# Patient Record
Sex: FEMALE | Race: BLACK OR AFRICAN AMERICAN | Employment: FULL TIME
[De-identification: names, ages, dates, MRNs, and addresses within clinical notes are randomized per-mention and may not be internally consistent; named-entity substitution may affect disease eponyms.]

---

## 2017-01-03 ENCOUNTER — TELEPHONE (OUTPATIENT)
Dept: OBGYN | Facility: CLINIC | Age: 37
End: 2017-01-03

## 2017-02-08 ENCOUNTER — OFFICE VISIT (OUTPATIENT)
Dept: FAMILY MEDICINE CLINIC | Facility: CLINIC | Age: 37
End: 2017-02-08

## 2017-02-08 VITALS
SYSTOLIC BLOOD PRESSURE: 132 MMHG | TEMPERATURE: 98.5 F | DIASTOLIC BLOOD PRESSURE: 78 MMHG | RESPIRATION RATE: 18 BRPM | WEIGHT: 185 LBS | HEART RATE: 78 BPM | BODY MASS INDEX: 25.9 KG/M2 | OXYGEN SATURATION: 99 % | HEIGHT: 71 IN

## 2017-02-08 DIAGNOSIS — K04.01 ACUTE PULPITIS: ICD-10-CM

## 2017-02-08 DIAGNOSIS — K08.89 PAIN, DENTAL: Primary | ICD-10-CM

## 2017-02-08 DIAGNOSIS — K14.1 GEOGRAPHIC TONGUE: ICD-10-CM

## 2017-02-08 PROCEDURE — 99213 OFFICE O/P EST LOW 20 MIN: CPT | Performed by: FAMILY MEDICINE

## 2017-02-08 RX ORDER — TRIAMCINOLONE ACETONIDE 0.1 %
PASTE (GRAM) DENTAL
Qty: 5 G | Refills: 2 | Status: SHIPPED | OUTPATIENT
Start: 2017-02-08 | End: 2017-11-13 | Stop reason: CLARIF

## 2017-02-08 RX ORDER — IBUPROFEN 800 MG/1
800 TABLET ORAL EVERY 6 HOURS PRN
Qty: 90 TABLET | Refills: 3 | Status: SHIPPED | OUTPATIENT
Start: 2017-02-08 | End: 2020-09-17

## 2017-02-08 RX ORDER — TRAMADOL HYDROCHLORIDE 50 MG/1
TABLET ORAL
Qty: 5 TABLET | Refills: 0 | Status: SHIPPED | OUTPATIENT
Start: 2017-02-08 | End: 2017-02-09 | Stop reason: ALTCHOICE

## 2017-02-08 RX ORDER — CLINDAMYCIN HYDROCHLORIDE 150 MG/1
150 CAPSULE ORAL 3 TIMES DAILY
Qty: 21 CAPSULE | Refills: 0 | Status: SHIPPED | OUTPATIENT
Start: 2017-02-08 | End: 2017-02-15

## 2017-02-08 ASSESSMENT — ENCOUNTER SYMPTOMS
GASTROINTESTINAL NEGATIVE: 1
SINUS PRESSURE: 1
EYES NEGATIVE: 1
ALLERGIC/IMMUNOLOGIC NEGATIVE: 1
RESPIRATORY NEGATIVE: 1

## 2017-02-09 ENCOUNTER — TELEPHONE (OUTPATIENT)
Dept: FAMILY MEDICINE CLINIC | Facility: CLINIC | Age: 37
End: 2017-02-09

## 2017-02-09 DIAGNOSIS — K08.89 PAIN, DENTAL: Primary | ICD-10-CM

## 2017-02-09 RX ORDER — TRAMADOL HYDROCHLORIDE 50 MG/1
50 TABLET ORAL EVERY 6 HOURS PRN
Qty: 5 TABLET | Refills: 0 | Status: SHIPPED | OUTPATIENT
Start: 2017-02-09 | End: 2017-11-13 | Stop reason: CLARIF

## 2017-03-14 ENCOUNTER — HOSPITAL ENCOUNTER (OUTPATIENT)
Age: 37
Setting detail: SPECIMEN
Discharge: HOME | End: 2017-03-14
Payer: COMMERCIAL

## 2017-03-14 LAB
ABSOLUTE EOS #: 0.1 K/UL (ref 0–0.4)
ABSOLUTE LYMPH #: 1.7 K/UL (ref 1–4.8)
ABSOLUTE MONO #: 0.4 K/UL (ref 0.1–1.2)
ALBUMIN SERPL-MCNC: 4.5 G/DL (ref 3.5–5.2)
ALBUMIN/GLOBULIN RATIO: 1.3 (ref 1–2.5)
ALP BLD-CCNC: 69 U/L (ref 35–104)
ALT SERPL-CCNC: 17 U/L (ref 5–33)
ANION GAP SERPL CALCULATED.3IONS-SCNC: 15 MMOL/L (ref 9–17)
AST SERPL-CCNC: 22 U/L
BASOPHILS # BLD: 1 % (ref 0–2)
BASOPHILS ABSOLUTE: 0 K/UL (ref 0–0.2)
BILIRUB SERPL-MCNC: 0.28 MG/DL (ref 0.3–1.2)
BUN BLDV-MCNC: 9 MG/DL (ref 6–20)
BUN/CREAT BLD: ABNORMAL (ref 9–20)
CALCIUM SERPL-MCNC: 9.2 MG/DL (ref 8.6–10.4)
CHLORIDE BLD-SCNC: 103 MMOL/L (ref 98–107)
CO2: 21 MMOL/L (ref 20–31)
CREAT SERPL-MCNC: 0.82 MG/DL (ref 0.5–0.9)
DIFFERENTIAL TYPE: ABNORMAL
EOSINOPHILS RELATIVE PERCENT: 1 % (ref 1–4)
GFR AFRICAN AMERICAN: >60 ML/MIN
GFR NON-AFRICAN AMERICAN: >60 ML/MIN
GFR SERPL CREATININE-BSD FRML MDRD: ABNORMAL ML/MIN/{1.73_M2}
GFR SERPL CREATININE-BSD FRML MDRD: ABNORMAL ML/MIN/{1.73_M2}
GLUCOSE BLD-MCNC: 101 MG/DL (ref 70–99)
HCT VFR BLD CALC: 33.7 % (ref 36–46)
HEMOGLOBIN: 10.8 G/DL (ref 12–16)
LYMPHOCYTES # BLD: 36 % (ref 24–44)
MCH RBC QN AUTO: 24.7 PG (ref 26–34)
MCHC RBC AUTO-ENTMCNC: 32 G/DL (ref 31–37)
MCV RBC AUTO: 77 FL (ref 80–100)
MONOCYTES # BLD: 8 % (ref 2–11)
PDW BLD-RTO: 15.3 % (ref 12.5–15.4)
PLATELET # BLD: 249 K/UL (ref 140–450)
PLATELET ESTIMATE: ABNORMAL
PMV BLD AUTO: 10.2 FL (ref 6–12)
POTASSIUM SERPL-SCNC: 4 MMOL/L (ref 3.7–5.3)
RBC # BLD: 4.38 M/UL (ref 4–5.2)
RBC # BLD: ABNORMAL 10*6/UL
SEG NEUTROPHILS: 54 % (ref 36–66)
SEGMENTED NEUTROPHILS ABSOLUTE COUNT: 2.6 K/UL (ref 1.8–7.7)
SODIUM BLD-SCNC: 139 MMOL/L (ref 135–144)
TOTAL PROTEIN: 7.9 G/DL (ref 6.4–8.3)
VITAMIN D 25-HYDROXY: 21.9 NG/ML (ref 30–100)
WBC # BLD: 4.8 K/UL (ref 3.5–11)
WBC # BLD: ABNORMAL 10*3/UL

## 2017-03-20 RX ORDER — ETONOGESTREL/ETHINYL ESTRADIOL .12-.015MG
RING, VAGINAL VAGINAL
Qty: 1 EACH | Refills: 5 | Status: SHIPPED | OUTPATIENT
Start: 2017-03-20 | End: 2017-11-13 | Stop reason: SDUPTHER

## 2017-08-10 ENCOUNTER — TELEPHONE (OUTPATIENT)
Dept: OBGYN CLINIC | Age: 37
End: 2017-08-10

## 2017-08-10 RX ORDER — ETONOGESTREL AND ETHINYL ESTRADIOL 11.7; 2.7 MG/1; MG/1
1 INSERT, EXTENDED RELEASE VAGINAL
Qty: 3 EACH | Refills: 3 | Status: SHIPPED | OUTPATIENT
Start: 2017-08-10 | End: 2018-12-24 | Stop reason: SDUPTHER

## 2017-11-13 ENCOUNTER — HOSPITAL ENCOUNTER (OUTPATIENT)
Age: 37
Setting detail: SPECIMEN
Discharge: HOME | End: 2017-11-13
Payer: COMMERCIAL

## 2017-11-13 ENCOUNTER — OFFICE VISIT (OUTPATIENT)
Dept: OBGYN CLINIC | Age: 37
End: 2017-11-13
Payer: COMMERCIAL

## 2017-11-13 VITALS
HEIGHT: 71 IN | DIASTOLIC BLOOD PRESSURE: 70 MMHG | WEIGHT: 210 LBS | BODY MASS INDEX: 29.4 KG/M2 | SYSTOLIC BLOOD PRESSURE: 124 MMHG

## 2017-11-13 DIAGNOSIS — Z01.419 ENCOUNTER FOR ROUTINE GYNECOLOGICAL EXAMINATION WITH PAPANICOLAOU SMEAR OF CERVIX: Primary | ICD-10-CM

## 2017-11-13 PROCEDURE — 99395 PREV VISIT EST AGE 18-39: CPT | Performed by: OBSTETRICS & GYNECOLOGY

## 2017-11-13 RX ORDER — ETONOGESTREL AND ETHINYL ESTRADIOL 11.7; 2.7 MG/1; MG/1
INSERT, EXTENDED RELEASE VAGINAL
Qty: 1 EACH | Refills: 12 | Status: SHIPPED | OUTPATIENT
Start: 2017-11-13 | End: 2020-07-22 | Stop reason: SDUPTHER

## 2017-11-13 ASSESSMENT — ENCOUNTER SYMPTOMS
DIARRHEA: 0
BLURRED VISION: 0
CONSTIPATION: 0
HEARTBURN: 0
ABDOMINAL PAIN: 0
VOMITING: 0
COUGH: 0
NAUSEA: 0
WHEEZING: 0
ORTHOPNEA: 0
DOUBLE VISION: 0

## 2017-11-13 NOTE — PROGRESS NOTES
well-nourished. HENT:   Head: Normocephalic. Eyes: EOM are normal.   Neck: Normal range of motion. No JVD present. No thyromegaly present. Cardiovascular: Normal rate, regular rhythm and normal heart sounds. Exam reveals no gallop and no friction rub. No murmur heard. Pulmonary/Chest: Effort normal. No respiratory distress. She has no wheezes. She has no rales. She exhibits no mass, no tenderness, no bony tenderness, no laceration, no edema, no swelling and no retraction. Right breast exhibits no inverted nipple, no mass, no nipple discharge, no skin change and no tenderness. Left breast exhibits no inverted nipple, no mass, no nipple discharge, no skin change and no tenderness. Breasts are symmetrical.   Abdominal: Soft. Bowel sounds are normal. She exhibits no distension and no mass. There is no hepatosplenomegaly. There is no tenderness. There is no rebound, no guarding and no CVA tenderness. Genitourinary: No labial fusion. There is no rash, tenderness, lesion or injury on the right labia. There is no rash, tenderness, lesion or injury on the left labia. Uterus is not deviated, not enlarged, not fixed and not tender. Cervix exhibits no motion tenderness, no discharge and no friability. Right adnexum displays no mass, no tenderness and no fullness. Left adnexum displays no mass, no tenderness and no fullness. No erythema, tenderness or bleeding in the vagina. No foreign body in the vagina. No signs of injury around the vagina. No vaginal discharge found. Genitourinary Comments: Fullness appreciated posteriorly on patient's left, feels consistent with fibroid   Musculoskeletal: Normal range of motion. She exhibits no edema, tenderness or deformity. Lymphadenopathy:     She has no cervical adenopathy. Neurological: She is alert and oriented to person, place, and time. She has normal reflexes. No cranial nerve deficit. Skin: Skin is warm and dry. No rash noted. No erythema.    Psychiatric: She

## 2017-11-14 LAB
HPV SAMPLE: NORMAL
HPV SOURCE: NORMAL
HPV, GENOTYPE 16: NOT DETECTED
HPV, GENOTYPE 18: NOT DETECTED
HPV, HIGH RISK OTHER: NOT DETECTED
HPV, INTERPRETATION: NORMAL

## 2017-11-20 LAB — CYTOLOGY REPORT: NORMAL

## 2017-12-01 ENCOUNTER — TELEPHONE (OUTPATIENT)
Dept: OBGYN CLINIC | Age: 37
End: 2017-12-01

## 2017-12-01 NOTE — TELEPHONE ENCOUNTER
patient calling for pap results. Advised ASCUS however HPV was negative return one yr for pap .  Patient verbaliozed understanding

## 2018-02-19 ENCOUNTER — OFFICE VISIT (OUTPATIENT)
Dept: FAMILY MEDICINE CLINIC | Age: 38
End: 2018-02-19
Payer: COMMERCIAL

## 2018-02-19 VITALS
SYSTOLIC BLOOD PRESSURE: 129 MMHG | TEMPERATURE: 98.4 F | HEART RATE: 112 BPM | WEIGHT: 218.6 LBS | BODY MASS INDEX: 30.6 KG/M2 | HEIGHT: 71 IN | DIASTOLIC BLOOD PRESSURE: 87 MMHG

## 2018-02-19 DIAGNOSIS — D64.9 ANEMIA, UNSPECIFIED TYPE: ICD-10-CM

## 2018-02-19 DIAGNOSIS — L40.9 SCALP PSORIASIS: Primary | ICD-10-CM

## 2018-02-19 DIAGNOSIS — Z23 NEED FOR INFLUENZA VACCINATION: ICD-10-CM

## 2018-02-19 PROCEDURE — 90715 TDAP VACCINE 7 YRS/> IM: CPT | Performed by: FAMILY MEDICINE

## 2018-02-19 PROCEDURE — 90472 IMMUNIZATION ADMIN EACH ADD: CPT | Performed by: FAMILY MEDICINE

## 2018-02-19 PROCEDURE — 99213 OFFICE O/P EST LOW 20 MIN: CPT | Performed by: FAMILY MEDICINE

## 2018-02-19 PROCEDURE — 90471 IMMUNIZATION ADMIN: CPT | Performed by: FAMILY MEDICINE

## 2018-02-19 PROCEDURE — 90686 IIV4 VACC NO PRSV 0.5 ML IM: CPT | Performed by: FAMILY MEDICINE

## 2018-02-19 RX ORDER — LANOLIN ALCOHOL/MO/W.PET/CERES
325 CREAM (GRAM) TOPICAL 2 TIMES DAILY
Qty: 90 TABLET | Refills: 0 | Status: SHIPPED | OUTPATIENT
Start: 2018-02-19 | End: 2019-01-31 | Stop reason: SDUPTHER

## 2018-02-19 ASSESSMENT — PATIENT HEALTH QUESTIONNAIRE - PHQ9
SUM OF ALL RESPONSES TO PHQ9 QUESTIONS 1 & 2: 0
2. FEELING DOWN, DEPRESSED OR HOPELESS: 0
1. LITTLE INTEREST OR PLEASURE IN DOING THINGS: 0
SUM OF ALL RESPONSES TO PHQ QUESTIONS 1-9: 0

## 2018-02-19 ASSESSMENT — ENCOUNTER SYMPTOMS
ABDOMINAL PAIN: 0
NAUSEA: 0
VOMITING: 0
COUGH: 0
CONSTIPATION: 0
SHORTNESS OF BREATH: 0
DIARRHEA: 0

## 2018-02-19 NOTE — PROGRESS NOTES
Subjective:      Felton Trotter is a 44 y.o. female with Hx of  has a past medical history of Abnormal Pap smear of cervix; Anxiety; Depression; GERD (gastroesophageal reflux disease); and Headache. Presented to the office today for:     HPI    44years old female with insignificant past medical history who came in to establish care. Patient feels well overall. Reports that she has some scalp rashes that comes on and off over the past few years. Tried ketoconazole shampoo with no relief. The rushes around the hairline. Worse with cold weather and associated with dryness. Had lab work done back in March 2017 shows anemia with hemoglobin of 10.8. Denies lightheadedness headache vision changes chest pain or lower extremity edema. Review of Systems   Constitutional: Negative for chills and fever. Eyes: Negative for visual disturbance. Respiratory: Negative for cough and shortness of breath. Cardiovascular: Negative for chest pain, palpitations and leg swelling. Gastrointestinal: Negative for abdominal pain, constipation, diarrhea, nausea and vomiting. Skin: Positive for rash. Neurological: Negative for headaches. Psychiatric/Behavioral: Negative for behavioral problems. Family History   Problem Relation Age of Onset    No Known Problems Mother     Asthma Father     Diabetes Maternal Grandmother      IDDM    Heart Attack Maternal Grandmother     Stroke Maternal Grandmother     Heart Attack Maternal Grandfather     No Known Problems Paternal Grandmother     No Known Problems Paternal Grandfather        Social History     Social History    Marital status: Single     Spouse name: N/A    Number of children: N/A    Years of education: N/A     Occupational History    Not on file.      Social History Main Topics    Smoking status: Never Smoker    Smokeless tobacco: Never Used    Alcohol use No    Drug use: No    Sexual activity: Yes     Partners: Male     Birth control/ protection: Inserts      Comment: NuvaRing     Other Topics Concern    Not on file     Social History Narrative    No narrative on file       Objective:      /87 (Site: Left Arm, Position: Sitting, Cuff Size: Large Adult) Comment: Machine  Pulse 112   Temp 98.4 °F (36.9 °C) (Temporal)   Ht 5' 10.98\" (1.803 m)   Wt 218 lb 9.6 oz (99.2 kg)   LMP 02/05/2018 (Exact Date)   BMI 30.50 kg/m²    BP Readings from Last 3 Encounters:   02/19/18 129/87   11/13/17 124/70   02/08/17 132/78       Physical Exam    General Appearance:  A&Ox3, NAD  HEENT: Head is NC/AT. No visible rash noted on the hair or skin of the face. Signs of infection  Neck: Supple, no LAD, no thyromegaly  Lungs:  Clear to auscultation B/L. Cardiovascular:  NL S1/S2, RRR, no m,g,r.  Skin: Intact, no bruising or bleeding on exposed skin area  Extremities: No cyanosis, clubbing or edema    Assessment:     1. Scalp psoriasis    2. Anemia, unspecified type    3. Need for influenza vaccination        Plan:      1. Scalp psoriasis  - We'll try hydrocortisone 2% lotion twice a day as needed. - Patient was counseled on possible side effects including skin discoloration.  - Counseled on appropriate use of steroid cream especially on the face  - Consider dermatology referral if no improvement with hydrocortisone    2. Anemia, unspecified type  - Restart ferrous sulfate  - We'll repeat hemoglobin after ferrous sulfate and possibly order iron studies    3. Need for influenza vaccination  - Flu vaccine given today      Paonia received counseling on the following healthy behaviors: nutrition, exercise and medication adherence  Reviewed prior labs and health maintenance  Continue current medications, diet and exercise. Discussed use, benefit, and side effects of prescribed medications. Barriers to medication compliance addressed.    Patient given educational materials - see patient instructions  Was a self-tracking handout given in paper form or via

## 2018-02-20 DIAGNOSIS — L40.9 SCALP PSORIASIS: Primary | ICD-10-CM

## 2018-02-20 RX ORDER — TRIAMCINOLONE ACETONIDE 1 MG/ML
LOTION TOPICAL
Qty: 60 ML | Refills: 0 | Status: SHIPPED | OUTPATIENT
Start: 2018-02-20 | End: 2018-02-27

## 2018-09-06 ENCOUNTER — TELEPHONE (OUTPATIENT)
Dept: OBGYN CLINIC | Age: 38
End: 2018-09-06

## 2018-09-06 RX ORDER — ETONOGESTREL AND ETHINYL ESTRADIOL 11.7; 2.7 MG/1; MG/1
1 INSERT, EXTENDED RELEASE VAGINAL SEE ADMIN INSTRUCTIONS
Qty: 3 EACH | Refills: 11 | Status: SHIPPED | OUTPATIENT
Start: 2018-09-06 | End: 2019-01-31 | Stop reason: SDUPTHER

## 2018-12-10 ENCOUNTER — NURSE ONLY (OUTPATIENT)
Dept: FAMILY MEDICINE CLINIC | Age: 38
End: 2018-12-10
Payer: COMMERCIAL

## 2018-12-10 DIAGNOSIS — Z23 NEED FOR INFLUENZA VACCINATION: Primary | ICD-10-CM

## 2018-12-10 PROCEDURE — 90686 IIV4 VACC NO PRSV 0.5 ML IM: CPT | Performed by: FAMILY MEDICINE

## 2018-12-24 ENCOUNTER — HOSPITAL ENCOUNTER (OUTPATIENT)
Age: 38
Setting detail: SPECIMEN
Discharge: HOME | End: 2018-12-24
Payer: COMMERCIAL

## 2018-12-24 ENCOUNTER — OFFICE VISIT (OUTPATIENT)
Dept: OBGYN CLINIC | Age: 38
End: 2018-12-24
Payer: COMMERCIAL

## 2018-12-24 VITALS — BODY MASS INDEX: 30.49 KG/M2 | SYSTOLIC BLOOD PRESSURE: 126 MMHG | DIASTOLIC BLOOD PRESSURE: 78 MMHG | HEIGHT: 71 IN

## 2018-12-24 DIAGNOSIS — Z01.419 ENCOUNTER FOR GYNECOLOGICAL EXAMINATION: Primary | ICD-10-CM

## 2018-12-24 DIAGNOSIS — Z30.44 ENCOUNTER FOR SURVEILLANCE OF VAGINAL RING HORMONAL CONTRACEPTIVE DEVICE: ICD-10-CM

## 2018-12-24 PROCEDURE — 99395 PREV VISIT EST AGE 18-39: CPT | Performed by: OBSTETRICS & GYNECOLOGY

## 2018-12-24 PROCEDURE — G8482 FLU IMMUNIZE ORDER/ADMIN: HCPCS | Performed by: OBSTETRICS & GYNECOLOGY

## 2018-12-24 RX ORDER — ETONOGESTREL AND ETHINYL ESTRADIOL 11.7; 2.7 MG/1; MG/1
1 INSERT, EXTENDED RELEASE VAGINAL
Qty: 9 EACH | Refills: 1 | Status: SHIPPED | OUTPATIENT
Start: 2018-12-24 | End: 2019-02-26 | Stop reason: SDUPTHER

## 2018-12-24 RX ORDER — DEXTROAMPHETAMINE SACCHARATE, AMPHETAMINE ASPARTATE, DEXTROAMPHETAMINE SULFATE AND AMPHETAMINE SULFATE 3.75; 3.75; 3.75; 3.75 MG/1; MG/1; MG/1; MG/1
TABLET ORAL
COMMUNITY
Start: 2018-12-18 | End: 2021-10-07

## 2018-12-24 RX ORDER — CICLOPIROX 1 G/100ML
SHAMPOO TOPICAL
COMMUNITY
Start: 2018-12-19 | End: 2022-01-11 | Stop reason: SDUPTHER

## 2018-12-24 ASSESSMENT — ENCOUNTER SYMPTOMS
NAUSEA: 0
COUGH: 0
ABDOMINAL PAIN: 0
WHEEZING: 0
CONSTIPATION: 0
VOMITING: 0
DIARRHEA: 0

## 2018-12-24 NOTE — PROGRESS NOTES
[facility]  [facility] [address] [place] [address] Road [address]  Dept: [phone]    DATE OF VISIT:  18        History and Physical    Yanet Soto    :  1980  CHIEF COMPLAINT:    Chief Complaint   Patient presents with    Annual Exam     Prev Pap: 17 WNL                    HPI :   Yanet Soto is a 43 y.o. female here for her annual GYN exam and pap smear. She has no concerns today. Doing well on Nuva Ring and would like to continue but prefers 3 month Rx.      _____________________________________________________________________  Past Medical History:   Diagnosis Date    Abnormal Pap smear of cervix     Anxiety     Depression     GERD (gastroesophageal reflux disease)     Headache                                                                    Past Surgical History:   Procedure Laterality Date     SECTION, LOW TRANSVERSE      COLPOSCOPY      GALLBLADDER SURGERY       Family History   Problem Relation Age of Onset    No Known Problems Mother     Asthma Father     Diabetes Maternal Grandmother         IDDM    Heart Attack Maternal Grandmother     Stroke Maternal Grandmother     Heart Attack Maternal Grandfather     No Known Problems Paternal Grandmother     No Known Problems Paternal Grandfather      History   Smoking Status    Never Smoker   Smokeless Tobacco    Never Used     History   Alcohol Use No     Current Outpatient Prescriptions   Medication Sig Dispense Refill    Ciclopirox 1 % SHAM       amphetamine-dextroamphetamine (ADDERALL) 15 MG tablet       etonogestrel-ethinyl estradiol (NUVARING) 0.12-0.015 MG/24HR vaginal ring Place 1 each vaginally every 21 days Insert one (1) ring vaginally and leave in place for three (3) weeks, then remove for one (1) week.  9 each 1    etonogestrel-ethinyl estradiol (NUVARING) 0.12-0.015 MG/24HR vaginal ring Place 1 each vaginally See Admin Instructions Replace

## 2019-01-10 LAB — CYTOLOGY REPORT: NORMAL

## 2019-01-31 ENCOUNTER — OFFICE VISIT (OUTPATIENT)
Dept: FAMILY MEDICINE CLINIC | Age: 39
End: 2019-01-31
Payer: COMMERCIAL

## 2019-01-31 VITALS
SYSTOLIC BLOOD PRESSURE: 121 MMHG | DIASTOLIC BLOOD PRESSURE: 83 MMHG | WEIGHT: 218.7 LBS | HEART RATE: 98 BPM | TEMPERATURE: 98 F | BODY MASS INDEX: 31.31 KG/M2 | HEIGHT: 70 IN

## 2019-01-31 DIAGNOSIS — M62.838 MUSCLE SPASMS OF NECK: Primary | ICD-10-CM

## 2019-01-31 PROCEDURE — 1036F TOBACCO NON-USER: CPT | Performed by: FAMILY MEDICINE

## 2019-01-31 PROCEDURE — G8417 CALC BMI ABV UP PARAM F/U: HCPCS | Performed by: FAMILY MEDICINE

## 2019-01-31 PROCEDURE — 99211 OFF/OP EST MAY X REQ PHY/QHP: CPT | Performed by: FAMILY MEDICINE

## 2019-01-31 PROCEDURE — G8427 DOCREV CUR MEDS BY ELIG CLIN: HCPCS | Performed by: FAMILY MEDICINE

## 2019-01-31 PROCEDURE — G8482 FLU IMMUNIZE ORDER/ADMIN: HCPCS | Performed by: FAMILY MEDICINE

## 2019-01-31 PROCEDURE — 99213 OFFICE O/P EST LOW 20 MIN: CPT | Performed by: FAMILY MEDICINE

## 2019-01-31 RX ORDER — CYCLOBENZAPRINE HCL 10 MG
10 TABLET ORAL NIGHTLY PRN
Qty: 10 TABLET | Refills: 0 | Status: SHIPPED | OUTPATIENT
Start: 2019-01-31 | End: 2019-02-10

## 2019-01-31 ASSESSMENT — ENCOUNTER SYMPTOMS
BACK PAIN: 0
COUGH: 0
VOMITING: 0
SORE THROAT: 0
ABDOMINAL PAIN: 0
DIARRHEA: 0
SHORTNESS OF BREATH: 0
CONSTIPATION: 0
NAUSEA: 0

## 2019-02-13 ENCOUNTER — TELEPHONE (OUTPATIENT)
Dept: FAMILY MEDICINE CLINIC | Age: 39
End: 2019-02-13

## 2019-02-26 ENCOUNTER — OFFICE VISIT (OUTPATIENT)
Dept: FAMILY MEDICINE CLINIC | Age: 39
End: 2019-02-26
Payer: COMMERCIAL

## 2019-02-26 VITALS — HEART RATE: 80 BPM | SYSTOLIC BLOOD PRESSURE: 123 MMHG | DIASTOLIC BLOOD PRESSURE: 85 MMHG | TEMPERATURE: 98 F

## 2019-02-26 DIAGNOSIS — M62.838 MUSCLE SPASMS OF NECK: Primary | ICD-10-CM

## 2019-02-26 PROCEDURE — 99213 OFFICE O/P EST LOW 20 MIN: CPT | Performed by: STUDENT IN AN ORGANIZED HEALTH CARE EDUCATION/TRAINING PROGRAM

## 2019-02-26 PROCEDURE — 99211 OFF/OP EST MAY X REQ PHY/QHP: CPT | Performed by: STUDENT IN AN ORGANIZED HEALTH CARE EDUCATION/TRAINING PROGRAM

## 2019-02-26 RX ORDER — LISDEXAMFETAMINE DIMESYLATE 70 MG/1
CAPSULE ORAL
COMMUNITY
Start: 2019-01-22 | End: 2019-02-26 | Stop reason: ALTCHOICE

## 2019-02-26 RX ORDER — KETOROLAC TROMETHAMINE 10 MG/1
10 TABLET, FILM COATED ORAL EVERY 6 HOURS PRN
Qty: 20 TABLET | Refills: 0 | Status: SHIPPED | OUTPATIENT
Start: 2019-02-26 | End: 2020-09-17

## 2019-02-26 RX ORDER — IBUPROFEN 800 MG/1
800 TABLET ORAL EVERY 6 HOURS PRN
Qty: 90 TABLET | Refills: 3 | Status: CANCELLED | OUTPATIENT
Start: 2019-02-26 | End: 2019-03-28

## 2019-02-26 ASSESSMENT — PATIENT HEALTH QUESTIONNAIRE - PHQ9
SUM OF ALL RESPONSES TO PHQ9 QUESTIONS 1 & 2: 0
1. LITTLE INTEREST OR PLEASURE IN DOING THINGS: 0
SUM OF ALL RESPONSES TO PHQ QUESTIONS 1-9: 0
SUM OF ALL RESPONSES TO PHQ QUESTIONS 1-9: 0
2. FEELING DOWN, DEPRESSED OR HOPELESS: 0

## 2019-02-26 ASSESSMENT — ENCOUNTER SYMPTOMS
BACK PAIN: 0
CHEST TIGHTNESS: 0
SHORTNESS OF BREATH: 0

## 2019-04-05 ENCOUNTER — HOSPITAL ENCOUNTER (OUTPATIENT)
Age: 39
Setting detail: SPECIMEN
Discharge: HOME | End: 2019-04-05
Payer: COMMERCIAL

## 2019-04-05 LAB
ABSOLUTE EOS #: 0.1 K/UL (ref 0–0.44)
ABSOLUTE IMMATURE GRANULOCYTE: <0.03 K/UL (ref 0–0.3)
ABSOLUTE LYMPH #: 2.15 K/UL (ref 1.1–3.7)
ABSOLUTE MONO #: 0.54 K/UL (ref 0.1–1.2)
ALBUMIN SERPL-MCNC: 4.5 G/DL (ref 3.5–5.2)
ALBUMIN/GLOBULIN RATIO: 1.2 (ref 1–2.5)
ALP BLD-CCNC: 64 U/L (ref 35–104)
ALT SERPL-CCNC: 20 U/L (ref 5–33)
ANION GAP SERPL CALCULATED.3IONS-SCNC: 18 MMOL/L (ref 9–17)
AST SERPL-CCNC: 21 U/L
BASOPHILS # BLD: 0 % (ref 0–2)
BASOPHILS ABSOLUTE: <0.03 K/UL (ref 0–0.2)
BILIRUB SERPL-MCNC: 0.53 MG/DL (ref 0.3–1.2)
BUN BLDV-MCNC: 8 MG/DL (ref 6–20)
BUN/CREAT BLD: ABNORMAL (ref 9–20)
CALCIUM SERPL-MCNC: 9.6 MG/DL (ref 8.6–10.4)
CHLORIDE BLD-SCNC: 110 MMOL/L (ref 98–107)
CHOLESTEROL/HDL RATIO: 2.3
CHOLESTEROL: 132 MG/DL
CO2: 16 MMOL/L (ref 20–31)
CREAT SERPL-MCNC: 0.6 MG/DL (ref 0.5–0.9)
DIFFERENTIAL TYPE: ABNORMAL
EOSINOPHILS RELATIVE PERCENT: 2 % (ref 1–4)
GFR AFRICAN AMERICAN: >60 ML/MIN
GFR NON-AFRICAN AMERICAN: >60 ML/MIN
GFR SERPL CREATININE-BSD FRML MDRD: ABNORMAL ML/MIN/{1.73_M2}
GFR SERPL CREATININE-BSD FRML MDRD: ABNORMAL ML/MIN/{1.73_M2}
GLUCOSE BLD-MCNC: 89 MG/DL (ref 70–99)
HCT VFR BLD CALC: 42.5 % (ref 36.3–47.1)
HDLC SERPL-MCNC: 58 MG/DL
HEMOGLOBIN: 12.6 G/DL (ref 11.9–15.1)
IMMATURE GRANULOCYTES: 0 %
LDL CHOLESTEROL: 60 MG/DL (ref 0–130)
LYMPHOCYTES # BLD: 37 % (ref 24–43)
MCH RBC QN AUTO: 24 PG (ref 25.2–33.5)
MCHC RBC AUTO-ENTMCNC: 29.6 G/DL (ref 28.4–34.8)
MCV RBC AUTO: 80.8 FL (ref 82.6–102.9)
MONOCYTES # BLD: 9 % (ref 3–12)
NRBC AUTOMATED: 0 PER 100 WBC
PDW BLD-RTO: 15.9 % (ref 11.8–14.4)
PLATELET # BLD: 266 K/UL (ref 138–453)
PLATELET ESTIMATE: ABNORMAL
PMV BLD AUTO: 12.5 FL (ref 8.1–13.5)
POTASSIUM SERPL-SCNC: 4.7 MMOL/L (ref 3.7–5.3)
RBC # BLD: 5.26 M/UL (ref 3.95–5.11)
RBC # BLD: ABNORMAL 10*6/UL
SEG NEUTROPHILS: 52 % (ref 36–65)
SEGMENTED NEUTROPHILS ABSOLUTE COUNT: 3.01 K/UL (ref 1.5–8.1)
SODIUM BLD-SCNC: 144 MMOL/L (ref 135–144)
TOTAL PROTEIN: 8.4 G/DL (ref 6.4–8.3)
TRIGL SERPL-MCNC: 71 MG/DL
TSH SERPL DL<=0.05 MIU/L-ACNC: 1.44 MIU/L (ref 0.3–5)
VITAMIN D 25-HYDROXY: 49.3 NG/ML (ref 30–100)
VLDLC SERPL CALC-MCNC: NORMAL MG/DL (ref 1–30)
WBC # BLD: 5.8 K/UL (ref 3.5–11.3)
WBC # BLD: ABNORMAL 10*3/UL

## 2020-07-22 ENCOUNTER — TELEPHONE (OUTPATIENT)
Dept: OBGYN CLINIC | Age: 40
End: 2020-07-22

## 2020-07-22 RX ORDER — ETONOGESTREL AND ETHINYL ESTRADIOL 11.7; 2.7 MG/1; MG/1
INSERT, EXTENDED RELEASE VAGINAL
Qty: 3 EACH | Refills: 4 | Status: SHIPPED | OUTPATIENT
Start: 2020-07-22 | End: 2020-07-23 | Stop reason: CLARIF

## 2020-07-22 NOTE — TELEPHONE ENCOUNTER
36 y/o Pt calling to get her NuvaRing refill and was told by  she needs appt. Pt states she thought she didn't need a pap for 3 yrs. Explained to pt that she needs breast exam, pelvic exam and needed a mammogram after her 39th b-day.     Pt scheduled annual 09/17/20    Pharmacy:  [facility]  Needs 90d supply NuvaRing

## 2020-07-23 RX ORDER — ETONOGESTREL AND ETHINYL ESTRADIOL 11.7; 2.7 MG/1; MG/1
1 INSERT, EXTENDED RELEASE VAGINAL SEE ADMIN INSTRUCTIONS
Qty: 3 EACH | Refills: 1 | Status: SHIPPED | OUTPATIENT
Start: 2020-07-23 | End: 2020-08-27 | Stop reason: SDUPTHER

## 2020-07-23 NOTE — TELEPHONE ENCOUNTER
Informed pt that the office received a call for [facility]. Rico Byers from [employer] states they fill Rx's for workmens comp cases only and this Rx will need to be sent to a different pharmacy. Pt couldn't remember name of her pharmacy and now thinks it is [facility]. Pt asking about central scheduling calling her to schedule her mammogram. Informed pt that they should call her but did realize yesterday during our phone call she was trying to do multiple things at once. I mailed her order to her with central scheduling number on it. (pt had been trying to talk to 2 people at the same time and write down phone number.)    Resent Rx to [facility].

## 2020-08-26 ENCOUNTER — TELEPHONE (OUTPATIENT)
Dept: OBGYN CLINIC | Age: 40
End: 2020-08-26

## 2020-08-26 NOTE — TELEPHONE ENCOUNTER
36 y/o Pt calling to say she is having trouble getting her NuvaRing fill thru [facility] and would like to just go back to filling Rx monthly thru [employer]. Pharmacy:  [facility]    12/24/18 Last Annual exam w/  Scheduled for 09/17/20 annual exam w/

## 2020-08-27 RX ORDER — ETONOGESTREL AND ETHINYL ESTRADIOL 11.7; 2.7 MG/1; MG/1
1 INSERT, EXTENDED RELEASE VAGINAL SEE ADMIN INSTRUCTIONS
Qty: 3 EACH | Refills: 1 | Status: SHIPPED | OUTPATIENT
Start: 2020-08-27 | End: 2021-10-07

## 2020-09-17 ENCOUNTER — OFFICE VISIT (OUTPATIENT)
Dept: OBGYN CLINIC | Age: 40
End: 2020-09-17
Payer: COMMERCIAL

## 2020-09-17 ENCOUNTER — HOSPITAL ENCOUNTER (OUTPATIENT)
Age: 40
Setting detail: SPECIMEN
Discharge: HOME | End: 2020-09-17
Payer: COMMERCIAL

## 2020-09-17 VITALS
BODY MASS INDEX: 29.12 KG/M2 | HEIGHT: 71 IN | WEIGHT: 208 LBS | SYSTOLIC BLOOD PRESSURE: 124 MMHG | DIASTOLIC BLOOD PRESSURE: 80 MMHG

## 2020-09-17 PROCEDURE — 99396 PREV VISIT EST AGE 40-64: CPT | Performed by: OBSTETRICS & GYNECOLOGY

## 2020-09-17 RX ORDER — ETONOGESTREL AND ETHINYL ESTRADIOL 11.7; 2.7 MG/1; MG/1
1 INSERT, EXTENDED RELEASE VAGINAL
Qty: 4 EACH | Refills: 4 | Status: SHIPPED | OUTPATIENT
Start: 2020-09-17 | End: 2021-10-04 | Stop reason: SDUPTHER

## 2020-09-17 ASSESSMENT — ENCOUNTER SYMPTOMS
COUGH: 0
ABDOMINAL PAIN: 0
VOMITING: 0
DIARRHEA: 0
CONSTIPATION: 0
NAUSEA: 0
WHEEZING: 0

## 2020-09-17 NOTE — PROGRESS NOTES
[facility]  [facility] [address] [address]  Dept: [phone]  DATE OF VISIT:  20        History and Physical    Juvenal Whitman    :  1980  CHIEF COMPLAINT:    Chief Complaint   Patient presents with    Annual Exam     Prev Pap: 18 WNL                    HPI :   Juvenal Whitman is a 35 y.o. female presents for her annual exam. She uses NuvaRing to control her periods and for contraception. She will occasionally stop Nuvaring for a few months and notices her periods are extremely heavy and last 7 days, but they are much more manageable with the NuvaRing. She is interested in taking it continuously. _____________________________________________________________________  Past Medical History:   Diagnosis Date    Abnormal Pap smear of cervix     Anxiety     Depression     GERD (gastroesophageal reflux disease)     Headache                                                                    Past Surgical History:   Procedure Laterality Date     SECTION, LOW TRANSVERSE      COLPOSCOPY      GALLBLADDER SURGERY  2004     Family History   Problem Relation Age of Onset    No Known Problems Mother     Asthma Father     Diabetes Maternal Grandmother         IDDM    Heart Attack Maternal Grandmother     Stroke Maternal Grandmother     Heart Attack Maternal Grandfather     No Known Problems Paternal Grandmother     No Known Problems Paternal Grandfather      Social History     Tobacco Use   Smoking Status Never Smoker   Smokeless Tobacco Never Used     Social History     Substance and Sexual Activity   Alcohol Use No    Alcohol/week: 0.0 standard drinks     Current Outpatient Medications   Medication Sig Dispense Refill    etonogestrel-ethinyl estradiol (NUVARING) 0.12-0.015 MG/24HR vaginal ring Place 1 each vaginally every 21 days Insert one (1) ring vaginally and leave in place for three (3) weeks, then replace. Note to pharmacy: pt taking continuously. 4 each 4    etonogestrel-ethinyl estradiol (NUVARING) 0.12-0.015 MG/24HR vaginal ring Place 1 each vaginally See Admin Instructions Insert one (1) ring vaginally and leave in place for three (3) weeks, then remove for one (1) week. 3 each 1    Ciclopirox 1 % SHAM       amphetamine-dextroamphetamine (ADDERALL) 15 MG tablet        No current facility-administered medications for this visit. Allergies:  Keflex [cephalexin]    Gynecologic History:  Patient's last menstrual period was 08/15/2020. Sexually Active:Yes  STD History: No  Pap Smear History: h/o ASCUS      OB History    Para Term  AB Living   1 1 1 0 0 1   SAB TAB Ectopic Molar Multiple Live Births   0 0 0 0 0 1       Review of Systems   Constitutional: Negative for chills and fever. HENT: Negative for hearing loss. Respiratory: Negative for cough and wheezing. Cardiovascular: Negative for chest pain and palpitations. Gastrointestinal: Negative for abdominal pain, constipation, diarrhea, nausea and vomiting. Genitourinary: Negative for dysuria, frequency and urgency. Musculoskeletal: Negative for myalgias. Skin: Negative for rash. Neurological: Negative for dizziness, weakness and headaches. Hematological: Does not bruise/bleed easily. Psychiatric/Behavioral: Negative for suicidal ideas. /80 (Position: Sitting, Cuff Size: Medium Adult)   Ht 5' 11\" (1.803 m)   Wt 208 lb (94.3 kg)   LMP 08/15/2020   BMI 29.01 kg/m²     Physical Exam  Constitutional:       Appearance: She is well-developed. HENT:      Head: Normocephalic. Neck:      Thyroid: No thyromegaly. Vascular: No JVD. Cardiovascular:      Rate and Rhythm: Normal rate and regular rhythm. Pulmonary:      Effort: Pulmonary effort is normal. No respiratory distress. Breath sounds: Normal breath sounds. No wheezing or rales. Chest:      Chest wall: No tenderness.       Breasts: Breasts are symmetrical.         Right: No inverted nipple, mass, nipple discharge, skin change or tenderness. Left: No inverted nipple, mass, nipple discharge, skin change or tenderness. Abdominal:      General: Bowel sounds are normal. There is no distension. Palpations: Abdomen is soft. Tenderness: There is no abdominal tenderness. Genitourinary:     Exam position: Supine. Labia:         Right: No rash, tenderness, lesion or injury. Left: No rash, tenderness, lesion or injury. Vagina: Normal. No foreign body. No erythema or bleeding. Cervix: No cervical motion tenderness, discharge or friability. Uterus: Not deviated, not enlarged, not fixed and not tender. Adnexa:         Right: No mass, tenderness or fullness. Left: No mass, tenderness or fullness. Musculoskeletal: Normal range of motion. Lymphadenopathy:      Cervical: No cervical adenopathy. Skin:     General: Skin is warm and dry. Neurological:      Mental Status: She is alert and oriented to person, place, and time. Deep Tendon Reflexes: Reflexes are normal and symmetric. Psychiatric:         Behavior: Behavior normal.         Thought Content: Thought content normal.         Judgment: Judgment normal.                 ASSESSMENT:    35 y.o. Female; Annual   Diagnosis Orders   1. Encounter for gynecological examination  PAP SMEAR   2. Encounter for screening mammogram for breast cancer  [facility] DIGITAL SCREEN W OR WO CAD BILATERAL     Return in about 1 year (around 9/17/2021) for annual exam.              Hereditary Breast, Ovarian, Colon and Uterine Cancer screening Done. Tobacco & Secondary smoke risks reviewed; instructed on cessation and avoidance  :  - Pap collected, pt prefers yearly.  -Discussed menopausal symptoms, HRT, incontinence. Reviewed option of IUD for AUB.    - Screening mammogram discussed and advised yearly if normalstarting at age 35.  - Calcium and Vitamin D dosing reviewed. - Colonoscopy screening reviewed. - General diet and exercise reviewed. - Routine health maintenance per patients PCP.     Electronically signed by Rodger Aparicio MD on 9/17/20 at 10:43 AM EDT  [facility]

## 2020-09-25 LAB
HPV SOURCE: NORMAL
HPV, GENOTYPE 16: NOT DETECTED
HPV, GENOTYPE 18: NOT DETECTED
HPV, HIGH RISK OTHER: NOT DETECTED

## 2020-09-30 LAB — CYTOLOGY REPORT: NORMAL

## 2020-10-07 ENCOUNTER — NURSE TRIAGE (OUTPATIENT)
Dept: OTHER | Facility: CLINIC | Age: 40
End: 2020-10-07

## 2020-10-07 NOTE — TELEPHONE ENCOUNTER
Attention provider: Your patient utilized nurse triage services offered by employer, payer or community. This encounter includes an overview of the reason for call, assessment and recommended disposition. Please do not respond through this encounter as the response is not directed to a shared pool. Reason for Disposition   Ringworm    Answer Assessment - Initial Assessment Questions  1. APPEARANCE of RASH: \"What does the rash look like? \"       [place] with a bunch of bumps, 2 spots,     2. LOCATION: \"Where is the rash located? \"       Thigh, 2 pots, right thigh    3. SIZE: \"How large are the spots? \"     Dime    4. NUMBER: \"How many spots are there?\"   2    5. ONSET: \"When did the ringworm start? \"    Over the past 2-3 days    6. OTHER SYMPTOMS: \"Do you have any other symptoms? \" (e.g., fever, headache, etc.)    Slightly itching  7. PREGNANCY: \"Is there any chance you are pregnant? \" \"When was your last menstrual period? \"  denies    Protocols used: [id number]

## 2021-01-05 ENCOUNTER — NURSE ONLY (OUTPATIENT)
Dept: FAMILY MEDICINE CLINIC | Age: 41
End: 2021-01-05
Payer: COMMERCIAL

## 2021-01-05 VITALS — TEMPERATURE: 98.2 F

## 2021-01-05 DIAGNOSIS — Z23 NEED FOR INFLUENZA VACCINATION: Primary | ICD-10-CM

## 2021-01-05 PROCEDURE — 90686 IIV4 VACC NO PRSV 0.5 ML IM: CPT | Performed by: STUDENT IN AN ORGANIZED HEALTH CARE EDUCATION/TRAINING PROGRAM

## 2021-01-05 PROCEDURE — 99421 OL DIG E/M SVC 5-10 MIN: CPT | Performed by: STUDENT IN AN ORGANIZED HEALTH CARE EDUCATION/TRAINING PROGRAM

## 2021-01-07 ENCOUNTER — OFFICE VISIT (OUTPATIENT)
Dept: FAMILY MEDICINE CLINIC | Age: 41
End: 2021-01-07
Payer: COMMERCIAL

## 2021-01-07 VITALS
DIASTOLIC BLOOD PRESSURE: 86 MMHG | HEIGHT: 70 IN | HEART RATE: 102 BPM | BODY MASS INDEX: 29.78 KG/M2 | SYSTOLIC BLOOD PRESSURE: 137 MMHG | WEIGHT: 208 LBS | TEMPERATURE: 98.1 F

## 2021-01-07 DIAGNOSIS — M25.531 RIGHT WRIST PAIN: Primary | ICD-10-CM

## 2021-01-07 DIAGNOSIS — R63.5 WEIGHT GAIN: ICD-10-CM

## 2021-01-07 PROCEDURE — G8482 FLU IMMUNIZE ORDER/ADMIN: HCPCS | Performed by: STUDENT IN AN ORGANIZED HEALTH CARE EDUCATION/TRAINING PROGRAM

## 2021-01-07 PROCEDURE — G8427 DOCREV CUR MEDS BY ELIG CLIN: HCPCS | Performed by: STUDENT IN AN ORGANIZED HEALTH CARE EDUCATION/TRAINING PROGRAM

## 2021-01-07 PROCEDURE — 99213 OFFICE O/P EST LOW 20 MIN: CPT | Performed by: STUDENT IN AN ORGANIZED HEALTH CARE EDUCATION/TRAINING PROGRAM

## 2021-01-07 PROCEDURE — 1036F TOBACCO NON-USER: CPT | Performed by: STUDENT IN AN ORGANIZED HEALTH CARE EDUCATION/TRAINING PROGRAM

## 2021-01-07 PROCEDURE — G8419 CALC BMI OUT NRM PARAM NOF/U: HCPCS | Performed by: STUDENT IN AN ORGANIZED HEALTH CARE EDUCATION/TRAINING PROGRAM

## 2021-01-07 RX ORDER — PHENYLEPHRINE HYDROCHLORIDE 10 MG/1
1 TABLET, COATED ORAL NIGHTLY
Qty: 1 EACH | Refills: 0 | Status: SHIPPED | OUTPATIENT
Start: 2021-01-07

## 2021-01-07 RX ORDER — MELOXICAM 15 MG/1
15 TABLET ORAL DAILY
Qty: 20 TABLET | Refills: 0 | Status: SHIPPED | OUTPATIENT
Start: 2021-01-07 | End: 2021-01-25

## 2021-01-07 ASSESSMENT — ENCOUNTER SYMPTOMS
COUGH: 0
NAUSEA: 0
CONSTIPATION: 0
SORE THROAT: 0
DIARRHEA: 0
SHORTNESS OF BREATH: 0
CHEST TIGHTNESS: 0
ABDOMINAL PAIN: 0
VOMITING: 0

## 2021-01-07 ASSESSMENT — PATIENT HEALTH QUESTIONNAIRE - PHQ9
SUM OF ALL RESPONSES TO PHQ QUESTIONS 1-9: 0
SUM OF ALL RESPONSES TO PHQ QUESTIONS 1-9: 0
2. FEELING DOWN, DEPRESSED OR HOPELESS: 0

## 2021-01-07 NOTE — PROGRESS NOTES
Visit Information    Have you changed or started any medications since your last visit including any over-the-counter medicines, vitamins, or herbal medicines? no   Have you stopped taking any of your medications? Is so, why? -  no  Are you having any side effects from any of your medications? - no    Have you seen any other physician or provider since your last visit?  no   Have you had any other diagnostic tests since your last visit?  no   Have you been seen in the emergency room and/or had an admission in a hospital since we last saw you?  no   Have you had your routine dental cleaning in the past 6 months?  no     Do you have an active MyChart account? If no, what is the barrier?   Yes    Patient Care Team:  Tj Kelly MD as PCP - General (Family Medicine)    Medical History Review  Past Medical, Family, and Social History reviewed and does contribute to the patient presenting condition    Health Maintenance   Topic Date Due    Hepatitis C screen  1980    HIV screen  10/01/1994    Lipid screen  04/05/2024    Cervical cancer screen  09/17/2025    DTaP/Tdap/Td vaccine (2 - Td) 02/19/2028    Flu vaccine  Completed    Hepatitis A vaccine  Aged Out    Hepatitis B vaccine  Aged Out    Hib vaccine  Aged Out    Meningococcal (ACWY) vaccine  Aged Out    Pneumococcal 0-64 years Vaccine  Aged Out

## 2021-01-07 NOTE — PROGRESS NOTES
Musculoskeletal: Negative for joint swelling. Right wrist pain   Skin: Negative for rash. Neurological: Negative for dizziness, weakness and headaches. Objective:    /86 (Site: Right Upper Arm, Position: Sitting, Cuff Size: Large Adult)   Pulse 102   Temp 98.1 °F (36.7 °C) (Temporal)   Ht 5' 10\" (1.778 m)   Wt 208 lb (94.3 kg)   BMI 29.84 kg/m²    BP Readings from Last 3 Encounters:   01/07/21 137/86   09/17/20 124/80   02/26/19 123/85     Physical Exam  Vitals signs and nursing note reviewed. Constitutional:       Appearance: She is well-developed. Cardiovascular:      Rate and Rhythm: Normal rate and regular rhythm. Heart sounds: Normal heart sounds. No murmur. No friction rub. No gallop. Pulmonary:      Effort: Pulmonary effort is normal. No respiratory distress. Breath sounds: Normal breath sounds. No wheezing or rales. Chest:      Chest wall: No tenderness. Abdominal:      General: Bowel sounds are normal. There is no distension. Palpations: Abdomen is soft. There is no mass. Tenderness: There is no abdominal tenderness. There is no guarding. Musculoskeletal:      Comments: POSITIVE TINNEL AND PHALEN SIGN   Skin:     Capillary Refill: Capillary refill takes less than 2 seconds. Neurological:      Mental Status: She is alert and oriented to person, place, and time. Lab Results   Component Value Date    WBC 5.8 04/05/2019    HGB 12.6 04/05/2019    HCT 42.5 04/05/2019     04/05/2019    CHOL 132 04/05/2019    TRIG 71 04/05/2019    HDL 58 04/05/2019    ALT 20 04/05/2019    AST 21 04/05/2019     04/05/2019    K 4.7 04/05/2019     (H) 04/05/2019    CREATININE 0.60 04/05/2019    BUN 8 04/05/2019    CO2 16 (L) 04/05/2019    TSH 1.44 04/05/2019     Lab Results   Component Value Date    CALCIUM 9.6 04/05/2019     Lab Results   Component Value Date    LDLCHOLESTEROL 60 04/05/2019       Assessment and Plan:    1.  Right wrist pain  - Elastic Bandages & Supports (CARPAL TUNNEL WRIST STABILIZER) MISC; 1 applicator by Does not apply route nightly RIGHT WRIST  Dispense: 1 each; Refill: 0  - meloxicam (MOBIC) 15 MG tablet; Take 1 tablet by mouth daily  Dispense: 20 tablet; Refill: 0    2. Weight gain  - TSH With Reflex Ft4; Future  - Vitamin D 25 Hydroxy; Future  - Basic Metabolic Panel; Future    Requested Prescriptions     Signed Prescriptions Disp Refills    Elastic Bandages & Supports (CARPAL TUNNEL WRIST STABILIZER) MISC 1 each 0     Si applicator by Does not apply route nightly RIGHT WRIST    meloxicam (MOBIC) 15 MG tablet 20 tablet 0     Sig: Take 1 tablet by mouth daily       There are no discontinued medications. Return in about 4 weeks (around 2021) for CARPAL TUNNEL F/U SP WRIST BRACE. Lopez received counseling on the following healthy behaviors: nutrition and exercise  Reviewed prior labs and health maintenance  Continue current medications, diet and exercise. Discussed use, benefit, and side effects of prescribed medications. Barriers to medication compliance addressed. Patient given educational materials - see patient instructions  Was a self-tracking handout given in paper form or via [facility]? Yes    Requested Prescriptions     Signed Prescriptions Disp Refills    Elastic Bandages & Supports (CARPAL TUNNEL WRIST STABILIZER) MISC 1 each 0     Si applicator by Does not apply route nightly RIGHT WRIST    meloxicam (MOBIC) 15 MG tablet 20 tablet 0     Sig: Take 1 tablet by mouth daily       All patient questions answered. Patient voiced understanding. Quality Measures    Body mass index is 29.84 kg/m². Normal. Weight control planned discussed Healthy diet and regular exercise. BP: 137/86 Blood pressure is normal. Treatment plan consists of No treatment change needed.     Lab Results   Component Value Date    LDLCHOLESTEROL 60 2019    (goal LDL reduction with dx if diabetes is 50% LDL reduction) PHQ Scores 1/7/2021 2/26/2019 2/19/2018 12/28/2016   PHQ2 Score 0 0 0 2   PHQ9 Score 0 0 0 2     Interpretation of Total Score Depression Severity: 1-4 = Minimal depression, 5-9 = Mild depression, 10-14 = Moderate depression, 15-19 = Moderately severe depression, 20-27 = Severe depression

## 2021-01-07 NOTE — PROGRESS NOTES
I have reviewed and discussed key elements of [address] [place] [address] with the resident including plan of care and follow up and agree with the care kourtney plan.

## 2021-01-14 ENCOUNTER — HOSPITAL ENCOUNTER (OUTPATIENT)
Age: 41
Setting detail: SPECIMEN
Discharge: HOME | End: 2021-01-14
Payer: COMMERCIAL

## 2021-01-14 DIAGNOSIS — R63.5 WEIGHT GAIN: ICD-10-CM

## 2021-01-14 LAB
ANION GAP SERPL CALCULATED.3IONS-SCNC: 8 MMOL/L (ref 9–17)
BUN BLDV-MCNC: 8 MG/DL (ref 6–20)
BUN/CREAT BLD: ABNORMAL (ref 9–20)
CALCIUM SERPL-MCNC: 9.2 MG/DL (ref 8.6–10.4)
CHLORIDE BLD-SCNC: 107 MMOL/L (ref 98–107)
CO2: 20 MMOL/L (ref 20–31)
CREAT SERPL-MCNC: 0.64 MG/DL (ref 0.5–0.9)
GFR AFRICAN AMERICAN: >60 ML/MIN
GFR NON-AFRICAN AMERICAN: >60 ML/MIN
GFR SERPL CREATININE-BSD FRML MDRD: ABNORMAL ML/MIN/{1.73_M2}
GFR SERPL CREATININE-BSD FRML MDRD: ABNORMAL ML/MIN/{1.73_M2}
GLUCOSE BLD-MCNC: 90 MG/DL (ref 70–99)
POTASSIUM SERPL-SCNC: 3.9 MMOL/L (ref 3.7–5.3)
SODIUM BLD-SCNC: 135 MMOL/L (ref 135–144)
TSH SERPL DL<=0.05 MIU/L-ACNC: 0.93 MIU/L (ref 0.3–5)
VITAMIN D 25-HYDROXY: 27.7 NG/ML (ref 30–100)

## 2021-01-18 ENCOUNTER — OFFICE VISIT (OUTPATIENT)
Dept: FAMILY MEDICINE CLINIC | Age: 41
End: 2021-01-18
Payer: COMMERCIAL

## 2021-01-18 ENCOUNTER — TELEPHONE (OUTPATIENT)
Dept: FAMILY MEDICINE CLINIC | Age: 41
End: 2021-01-18

## 2021-01-18 VITALS
TEMPERATURE: 97.1 F | BODY MASS INDEX: 31.75 KG/M2 | DIASTOLIC BLOOD PRESSURE: 84 MMHG | SYSTOLIC BLOOD PRESSURE: 131 MMHG | HEART RATE: 95 BPM | WEIGHT: 221.8 LBS | HEIGHT: 70 IN

## 2021-01-18 DIAGNOSIS — G89.29 WRIST PAIN, CHRONIC, RIGHT: ICD-10-CM

## 2021-01-18 DIAGNOSIS — E66.9 CLASS 1 OBESITY WITHOUT SERIOUS COMORBIDITY WITH BODY MASS INDEX (BMI) OF 31.0 TO 31.9 IN ADULT, UNSPECIFIED OBESITY TYPE: Primary | ICD-10-CM

## 2021-01-18 DIAGNOSIS — M25.531 WRIST PAIN, CHRONIC, RIGHT: ICD-10-CM

## 2021-01-18 PROCEDURE — G8482 FLU IMMUNIZE ORDER/ADMIN: HCPCS | Performed by: FAMILY MEDICINE

## 2021-01-18 PROCEDURE — G8417 CALC BMI ABV UP PARAM F/U: HCPCS | Performed by: FAMILY MEDICINE

## 2021-01-18 PROCEDURE — 99213 OFFICE O/P EST LOW 20 MIN: CPT | Performed by: FAMILY MEDICINE

## 2021-01-18 PROCEDURE — 1036F TOBACCO NON-USER: CPT | Performed by: FAMILY MEDICINE

## 2021-01-18 PROCEDURE — G8427 DOCREV CUR MEDS BY ELIG CLIN: HCPCS | Performed by: FAMILY MEDICINE

## 2021-01-18 RX ORDER — BUPROPION HYDROCHLORIDE 300 MG/1
TABLET ORAL
COMMUNITY
Start: 2020-12-18 | End: 2021-10-07

## 2021-01-18 RX ORDER — DIAZEPAM 5 MG/1
TABLET ORAL
COMMUNITY
Start: 2020-12-21 | End: 2021-10-07

## 2021-01-18 RX ORDER — NAPROXEN 500 MG/1
500 TABLET ORAL 2 TIMES DAILY WITH MEALS
Qty: 60 TABLET | Refills: 0 | Status: SHIPPED | OUTPATIENT
Start: 2021-01-18 | End: 2021-02-09

## 2021-01-18 RX ORDER — FLUOCINOLONE ACETONIDE 0.11 MG/ML
OIL TOPICAL
COMMUNITY
Start: 2020-12-21 | End: 2022-01-11 | Stop reason: SDUPTHER

## 2021-01-18 NOTE — PATIENT INSTRUCTIONS
Thank you for letting us take care of you today. We hope all your questions were addressed. If a question was overlooked or something else comes to mind after you return home, please contact a member of your Care Team listed below. Your Care Team at [place] [address] is Team #5  Leslee Rothman MD (Faculty)  Sherie Castellanos MD (Resident)  Efe Bustos MD (Resident)  Jamarcus Reynolds MD (Resident)  FLORIDA Reeves. ,BRAYAN COBURN, BRAYAN Mederos ([facility] office)  [facility] office)  Carmen Merchant, [address] (Clinical Practice Manager)  Judith Carr [facility] (Clinical Pharmacist)       Office phone number: [phone]    If you need to get in right away due to illness, please be advised we have \"Same Day\" appointments available Monday-Friday. Please call us at [phone] option #3 to schedule your \"Same Day\" appointment.

## 2021-01-18 NOTE — PROGRESS NOTES
2021     Kesha Cruz (:  1980) is a 38 y.o. female, here for evaluation of the following medical concerns:  Chief Complaint   Patient presents with    2 Week Follow-Up     carpal tunnel    Weight Gain    Discuss Labs     results     Discuss Medications     pain medication meloxiam not helping     Other     order for wrist DME not covered by insurance      HPI   Dx with carpal tunnel prior visits , prescribed a splint , did not obtain. Meloxicam not helping   Asking about trying diet pills although states she does not have a big appetite. Asking about adipex. On adderall. Review of Systems   Constitutional: Negative for fatigue and fever. Respiratory: Negative for cough and shortness of breath. Musculoskeletal: Positive for arthralgias. Neurological: Negative for numbness. Prior to Visit Medications    Medication Sig Taking? Authorizing Provider   buPROPion (WELLBUTRIN XL) 300 MG extended release tablet take 1 tablet by mouth every morning Yes Historical Provider, MD   fluocinolone (DERMA-SMOOTHE) 0.01 % external oil  Yes Historical Provider, MD   naproxen (NAPROSYN) 500 MG tablet Take 1 tablet by mouth 2 times daily (with meals) Yes El Bryson DO   etonogestrel-ethinyl estradiol (NUVARING) 0.12-0.015 MG/24HR vaginal ring Place 1 each vaginally every 21 days Insert one (1) ring vaginally and leave in place for three (3) weeks, then replace. Note to pharmacy: pt taking continuously. Yes Irena Watters MD   Ciclopirox 1 % SHAM  Yes Historical Provider, MD   amphetamine-dextroamphetamine (ADDERALL) 15 MG tablet  Yes Historical Provider, MD   vitamin D (ERGOCALCIFEROL) 1.25 MG (49796 UT) CAPS capsule Take 1 capsule by mouth once a week  Angella Brunson MD   diazePAM (VALIUM) 5 MG tablet take 1 tablet by mouth 1 HOUR BEFORE BED, THEN NIGHT BEFORE DENTA. ..  (REFER TO PRESCRIPTION NOTES).   Historical Provider, MD   Elastic Bandages & Supports (CARPAL TUNNEL WRIST STABILIZER) MISC 1 applicator by Does not apply route nightly RIGHT WRIST  Patient not taking: Reported on 1/18/2021  Nida Cooper MD   meloxicam (MOBIC) 15 MG tablet Take 1 tablet by mouth daily  Patient not taking: Reported on 1/18/2021  Nida Cooper MD   etonogestrel-ethinyl estradiol (NUVARING) 0.12-0.015 MG/24HR vaginal ring Place 1 each vaginally See Admin Instructions Insert one (1) ring vaginally and leave in place for three (3) weeks, then remove for one (1) week. Elsi Ball MD      Allergies   Allergen Reactions    Keflex [Cephalexin] Hives     Social History     Tobacco Use    Smoking status: Never Smoker    Smokeless tobacco: Never Used   Substance Use Topics    Alcohol use: No     Alcohol/week: 0.0 standard drinks        Vitals:    01/18/21 1613   BP: 131/84  Comment: machine   Site: Left Upper Arm   Position: Sitting   Cuff Size: Medium Adult   Pulse: 95   Temp: 97.1 °F (36.2 °C)   TempSrc: Temporal   Weight: 221 lb 12.8 oz (100.6 kg)   Height: 5' 10\" (1.778 m)     Estimated body mass index is 31.82 kg/m² as calculated from the following:    Height as of this encounter: 5' 10\" (1.778 m). Weight as of this encounter: 221 lb 12.8 oz (100.6 kg). Physical Exam  Constitutional:       Appearance: Normal appearance. Cardiovascular:      Rate and Rhythm: Normal rate and regular rhythm. Pulmonary:      Effort: Pulmonary effort is normal.   Neurological:      Mental Status: She is alert. Psychiatric:         Mood and Affect: Mood normal.         Behavior: Behavior normal.         ASSESSMENT/PLAN:     Diagnosis Orders   1. Class 1 obesity without serious comorbidity with body mass index (BMI) of 31.0 to 31.9 in adult, unspecified obesity type  [address]   2. Wrist pain, chronic, right  XR WRIST RIGHT (2 VIEWS)     Return in about 6 weeks (around 3/1/2021) for recheck. will refer to nutrition services.  Discussed adipex , did not recommend due to being an appetite suppressant and she does not have problems with her appetite. Suspect bad food choices. Discussed Saxenda or something similar, she wants to read about it first. Discussed weight loss on antidiabetic meds is healthy but not as much as she is hoping. Encouraged getting splint to use along with antiinflammatory, will check xray. Naproxen ordered  Requested Prescriptions     Signed Prescriptions Disp Refills    naproxen (NAPROSYN) 500 MG tablet 60 tablet 0     Sig: Take 1 tablet by mouth 2 times daily (with meals)     An electronic signature was used to authenticate this note.     --[place] DO on1/25/2021 at 10:50 AM

## 2021-01-18 NOTE — TELEPHONE ENCOUNTER
Advise patient that the weight loss medication that you can take if you don't have diabetes is a daily shot. The other medication that is in pill form are for overweight people with diabetes. Would she like me to prescribe the shot? Most common side effects anre nausea, vomiting, diarrhea but don't happen in most people. Sorry for confusion.

## 2021-01-18 NOTE — PROGRESS NOTES
Visit Information    Have you changed or started any medications since your last visit including any over-the-counter medicines, vitamins, or herbal medicines? no   Have you stopped taking any of your medications? Is so, why? - yes see list   Are you having any side effects from any of your medications? - no    Have you seen any other physician or provider since your last visit?  no   Have you had any other diagnostic tests since your last visit?  no   Have you been seen in the emergency room and/or had an admission in a hospital since we last saw you?  no   Have you had your routine dental cleaning in the past 6 months?  09/2020    Do you have an active [employer] unlimited account? If no, what is the barrier?   Yes    Patient Care Team:  Alfonzo Butt MD as PCP - General (Family Medicine)    Medical History Review  Past Medical, Family, and Social History reviewed and does not contribute to the patient presenting condition    Health Maintenance   Topic Date Due    Hepatitis C screen  1980    HIV screen  10/01/1994    Lipid screen  04/05/2024    Cervical cancer screen  09/17/2025    DTaP/Tdap/Td vaccine (2 - Td) 02/19/2028    Flu vaccine  Completed    Hepatitis A vaccine  Aged Out    Hepatitis B vaccine  Aged Out    Hib vaccine  Aged Out    Meningococcal (ACWY) vaccine  Aged Out    Pneumococcal 0-64 years Vaccine  Aged Out

## 2021-01-19 NOTE — TELEPHONE ENCOUNTER
Writer attempted to call patient back with message from physician, but to no avail left voicemail message for her to call office back.

## 2021-01-21 DIAGNOSIS — E55.9 VITAMIN D DEFICIENCY: Primary | ICD-10-CM

## 2021-01-21 RX ORDER — ERGOCALCIFEROL 1.25 MG/1
50000 CAPSULE ORAL WEEKLY
Qty: 4 CAPSULE | Refills: 0 | Status: SHIPPED | OUTPATIENT
Start: 2021-01-21 | End: 2021-10-07

## 2021-01-25 ASSESSMENT — ENCOUNTER SYMPTOMS
SHORTNESS OF BREATH: 0
COUGH: 0

## 2021-01-28 ENCOUNTER — TELEPHONE (OUTPATIENT)
Dept: FAMILY MEDICINE CLINIC | Age: 41
End: 2021-01-28

## 2021-01-28 NOTE — TELEPHONE ENCOUNTER
Call from patient stating she needs Adipex  For weight loss, patient stated she would like to talk with manager about not getting the service she ask for. But provider did  explain about this medication to patient and what the medication is for, but refuse to listen. Do not want shot mediation only pills.       Please review and advise

## 2021-02-09 NOTE — TELEPHONE ENCOUNTER
E-scribe request for naproxen . Please review and e-scribe if applicable.      Last Visit Date:  01/18/2021  Next Visit Date:  2/26/2021    No results found for: LABA1C          ( goal A1C is < 7)   No results found for: LABMICR  LDL Cholesterol (mg/dL)   Date Value   04/05/2019 60       (goal LDL is <100)   AST (U/L)   Date Value   04/05/2019 21     ALT (U/L)   Date Value   04/05/2019 20     BUN (mg/dL)   Date Value   01/14/2021 8     BP Readings from Last 3 Encounters:   01/18/21 131/84   01/07/21 137/86   09/17/20 124/80          (goal 120/80)        Patient Active Problem List:     Gallstones     Anxiety     Stress     Psychophysiological insomnia     Adjustment disorder with depressed mood     Seborrheic dermatitis      ----Laura Page

## 2021-02-11 RX ORDER — NAPROXEN 500 MG/1
TABLET ORAL
Qty: 60 TABLET | Refills: 3 | Status: SHIPPED | OUTPATIENT
Start: 2021-02-11 | End: 2022-06-08

## 2021-02-17 ENCOUNTER — TELEMEDICINE (OUTPATIENT)
Dept: FAMILY MEDICINE CLINIC | Age: 41
End: 2021-02-17
Payer: COMMERCIAL

## 2021-02-17 ENCOUNTER — NURSE TRIAGE (OUTPATIENT)
Dept: OTHER | Facility: CLINIC | Age: 41
End: 2021-02-17

## 2021-02-17 DIAGNOSIS — L21.9 SEBORRHEIC DERMATITIS: Primary | ICD-10-CM

## 2021-02-17 DIAGNOSIS — R21 RASH AND NONSPECIFIC SKIN ERUPTION: ICD-10-CM

## 2021-02-17 PROCEDURE — 99213 OFFICE O/P EST LOW 20 MIN: CPT | Performed by: STUDENT IN AN ORGANIZED HEALTH CARE EDUCATION/TRAINING PROGRAM

## 2021-02-17 RX ORDER — WATER / MINERAL OIL / WHITE PETROLATUM 16 OZ
CREAM TOPICAL
Qty: 454 G | Refills: 1 | Status: SHIPPED | OUTPATIENT
Start: 2021-02-17

## 2021-02-17 NOTE — PROGRESS NOTES
I have reviewed and discussed key elements of [address] [place] [address] with the resident including plan of care and follow up and agree with the care kourtney plan.

## 2021-02-17 NOTE — TELEPHONE ENCOUNTER
[facility]  with red flag complaint. Brief description of triage: Pt states rash that comes and goes every winter, but is worse this year and is leaving dark spots and scaly spots. Pt states itching when spots first pop out. Pt denies any recent medication changes or changes in detergent/or soap. Triage indicates for patient to be seen by PCP today. Pt was told she might need VV. Care advice provided, patient verbalizes understanding; denies any other questions or concerns; instructed to call back for any new or worsening symptoms. Writer provided warm transfer to [place] at [facility] [place] for appointment scheduling. Attention Provider: Thank you for allowing me to participate in the care of your patient. The patient was connected to triage in response to information provided to the ECC. Please do not respond through this encounter as the response is not directed to a shared pool. Reason for Disposition   Patient wants to be seen    Answer Assessment - Initial Assessment Questions  1. APPEARANCE of RASH: \"Describe the rash. \" (e.g., spots, blisters, raised areas, skin peeling, scaly)      Pt states dark circles that are popping up everywhere. Pt states now there are 2 patches, one on both legs. Pt states patches are increasing and not going away    2. SIZE: \"How big are the spots? \" (e.g., tip of pen, eraser, coin; inches, centimeters)      Pt states little patches are smaller than a dime and there are a lot of them and the big patches are about 3-4 inches. 3. LOCATION: \"Where is the rash located? \"      generalzie don body    4. COLOR: \"What color is the rash? \" (Note: It is difficult to assess rash color in people with darker-colored skin. When this situation occurs, simply ask the caller to describe what they see. )      Pt states dark patches and as they fade away they leave a dark ketan    5. ONSET: \"When did the rash begin? \"      Pt states circles began years ago during wintertime. Now she has more that popping up that aren't going away     6. FEVER: \"Do you have a fever? \" If so, ask: \"What is your temperature, how was it measured, and when did it start? \"      Denies    7. ITCHING: \"Does the rash itch? \" If so, ask: \"How bad is the itch? \" (Scale 1-10; or mild, moderate, severe)      Pt states itching when they first appear. Pt states mainly on calfs, they itch. Pt states then they are dark colored and scaly. 8. CAUSE: \"What do you think is causing the rash? \"      Pt unsure. Denies exema    9. MEDICATION FACTORS: \"Have you started any new medications within the last 2 weeks? \" (e.g., antibiotics)       Denies    10. OTHER SYMPTOMS: \"Do you have any other symptoms? \" (e.g., dizziness, headache, sore throat, joint pain)        Denies    11. PREGNANCY: \"Is there any chance you are pregnant? \" \"When was your last menstrual period? \"        Denies    Protocols used: RASH OR REDNESS - [facility]

## 2021-02-17 NOTE — PROGRESS NOTES
Javier Gordon is a 38 y.o. female evaluated via video on 2/17/2021. Consent:  She and/or health care decision maker is aware that that she may receive a bill for this telephone service, depending on her insurance coverage, and has provided verbal consent to proceed: Yes      Documentation:  I communicated with the patient and/or health care decision maker about Rash. Details of this discussion including any medical advice provided as belwo:    Rash   All over   Seems to be worse in the winter  Denies discharge   Itching  Has been trying old creams and things       Triamcinolone, flucinonide , ketoconazole, and fluticasone    Denies eczema   Around eyes, ears and nose  Worse on legs thighs and buttock  Denies new fragrances, soaps or makeup  No one else has rash in house, daughter has history of eczema   Denies on back     Unsure why she cannot have an in person visit for this rash       I affirm this is a Patient Initiated Episode with a Patient who has not had a related appointment within my department in the past 7 days or scheduled within the next 24 hours. Patient identification was verified at the start of the visit: Yes    Total Time: minutes: 11-20 minutes    Note: not billable if this call serves to triage the patient into an appointment for the relevant concern    Diagnoses and all orders for this visit:    Seborrheic dermatitis  -     sertaconazole nitrate (ERTACZO) 2 % CREA cream; Apply Twice daily for up to four weeks. Do not apply to face, head or neck  -     Skin Protectants, Misc. (EUCERIN) cream; Apply topically as needed. Rash and nonspecific skin eruption      Could be worsening seborrheic dermatitis. Pt has multiple creams that are not finished and uses them intermittently. Could be psychogenic. Denies rash on back and is present on places that are accessible by hands to scratch. Images/ video via Epic/ [employer] were very poor. Unable to differentiate visually.  Will give antifungal, hydrating lotion and advise to come to office for in person visit.      Purvis Braydoncil

## 2021-02-18 ENCOUNTER — OFFICE VISIT (OUTPATIENT)
Dept: FAMILY MEDICINE CLINIC | Age: 41
End: 2021-02-18
Payer: COMMERCIAL

## 2021-02-18 VITALS
HEIGHT: 70 IN | DIASTOLIC BLOOD PRESSURE: 98 MMHG | SYSTOLIC BLOOD PRESSURE: 142 MMHG | BODY MASS INDEX: 31.82 KG/M2 | HEART RATE: 116 BPM

## 2021-02-18 DIAGNOSIS — L30.9 DERMATITIS: ICD-10-CM

## 2021-02-18 PROCEDURE — G8417 CALC BMI ABV UP PARAM F/U: HCPCS | Performed by: FAMILY MEDICINE

## 2021-02-18 PROCEDURE — G8482 FLU IMMUNIZE ORDER/ADMIN: HCPCS | Performed by: FAMILY MEDICINE

## 2021-02-18 PROCEDURE — G8427 DOCREV CUR MEDS BY ELIG CLIN: HCPCS | Performed by: FAMILY MEDICINE

## 2021-02-18 PROCEDURE — 1036F TOBACCO NON-USER: CPT | Performed by: FAMILY MEDICINE

## 2021-02-18 PROCEDURE — 99213 OFFICE O/P EST LOW 20 MIN: CPT | Performed by: FAMILY MEDICINE

## 2021-02-18 RX ORDER — METHYLPREDNISOLONE 4 MG/1
TABLET ORAL
Qty: 1 KIT | Refills: 0 | Status: SHIPPED | OUTPATIENT
Start: 2021-02-18 | End: 2022-01-11

## 2021-02-18 RX ORDER — MUPIROCIN CALCIUM 20 MG/G
CREAM TOPICAL
Qty: 30 G | Refills: 0 | Status: SHIPPED | OUTPATIENT
Start: 2021-02-18 | End: 2021-03-20

## 2021-02-18 NOTE — PROGRESS NOTES
every 21 days Insert one (1) ring vaginally and leave in place for three (3) weeks, then replace. Note to pharmacy: pt taking continuously. Radha Mendoza MD   etonogestrel-ethinyl estradiol (NUVARING) 0.12-0.015 MG/24HR vaginal ring Place 1 each vaginally See Admin Instructions Insert one (1) ring vaginally and leave in place for three (3) weeks, then remove for one (1) week. Radha Mendoza MD   Ciclopirox 1 % SHAM   Historical Provider, MD   amphetamine-dextroamphetamine (ADDERALL) 15 MG tablet   Historical Provider, MD      Allergies   Allergen Reactions    Keflex [Cephalexin] Hives     Social History     Tobacco Use    Smoking status: Never Smoker    Smokeless tobacco: Never Used   Substance Use Topics    Alcohol use: No     Alcohol/week: 0.0 standard drinks        Vitals:    02/18/21 0846   BP: (!) 142/98   Site: Left Upper Arm   Position: Sitting   Cuff Size: Large Adult   Pulse: 116   Height: 5' 10\" (1.778 m)     Estimated body mass index is 31.82 kg/m² as calculated from the following:    Height as of this encounter: 5' 10\" (1.778 m). Weight as of 1/18/21: 221 lb 12.8 oz (100.6 kg). Physical Exam  Constitutional:       Appearance: Normal appearance. Skin:     Findings: Rash (scattered papules , varying stages of dryness. some excoriation, benign appearing) present. Neurological:      Mental Status: She is alert. ASSESSMENT/PLAN:     Diagnosis Orders   1. Dermatitis       Return in about 1 month (around 3/18/2021), or if symptoms worsen or fail to improve. Requested Prescriptions     Signed Prescriptions Disp Refills    methylPREDNISolone (MEDROL DOSEPACK) 4 MG tablet 1 kit 0     Sig: Take by mouth as directed on the package    mupirocin (BACTROBAN) 2 % cream 30 g 0     Sig: Apply 3 times daily. An electronic signature was used to authenticate this note.     --Laureen Little,  on3/2/2021 at 3:39 PM

## 2021-02-23 ENCOUNTER — TELEPHONE (OUTPATIENT)
Dept: FAMILY MEDICINE CLINIC | Age: 41
End: 2021-02-23

## 2021-02-23 NOTE — TELEPHONE ENCOUNTER
PA request for sertaconazole nitrate (ERTACZO)         PA processed and submitted to pt insurance, waiting for response in regards to coverage

## 2021-02-23 NOTE — TELEPHONE ENCOUNTER
Please advise the dispensing pharmacy to contact the [address] at [phone] for assistance.

## 2021-03-02 PROBLEM — L30.9 DERMATITIS: Status: ACTIVE | Noted: 2021-03-02

## 2021-03-02 ASSESSMENT — ENCOUNTER SYMPTOMS: COUGH: 0

## 2021-03-30 ENCOUNTER — TELEPHONE (OUTPATIENT)
Dept: FAMILY MEDICINE CLINIC | Age: 41
End: 2021-03-30

## 2021-03-30 NOTE — TELEPHONE ENCOUNTER
Pt calling very upset states her skin condition is not improving and is having more skin eruptions. Pt states [place] is tried of spending money on things that don't work\". Pt getting progressively upset . Offered pt a  V v visit and also a in office visit but declined both advised that it would benefit to see skin condition. Please advise?

## 2021-03-30 NOTE — TELEPHONE ENCOUNTER
When reviewing her meds, she has been on steroids, antifungals, anti bacterials. I think a dermatologist would have some other options. In the meantime remember to use hypoallergenic lotions, soaps, laundry detergents avoid hot showers.

## 2021-10-04 RX ORDER — ETONOGESTREL AND ETHINYL ESTRADIOL 11.7; 2.7 MG/1; MG/1
1 INSERT, EXTENDED RELEASE VAGINAL
Qty: 4 EACH | Refills: 0 | Status: SHIPPED | OUTPATIENT
Start: 2021-10-04 | End: 2021-12-28

## 2021-10-04 NOTE — TELEPHONE ENCOUNTER
etonogestrel-ethinyl estradiol (NUVARING) 0.12-0.015 MG/24HR vaginal ring 4 each 0 10/4/2021     Sig - Route: Place 1 each vaginally every 21 days Insert one (1) ring vaginally and leave in place for three (3) weeks, then replace. Note to pharmacy: pt taking continuously.  - Vaginal    Sent to pharmacy as: Etonogestrel-Ethinyl Estradiol 0.12-0.015 MG/24HR Vaginal Ring (NuvaRing)    E-Prescribing Status: Receipt confirmed by pharmacy (10/4/2021 12:07 PM EDT)

## 2021-10-06 NOTE — PROGRESS NOTES
[facility]   [facility] [address], [place] [address]      Karolina Dye  10/7/2021                       Primary Care Physician: Amarilis Alejandra DO    CC: No chief complaint on file. HPI: Karolina Dye is a 42 y.o. female  No LMP recorded. (Menstrual status: Other - See Notes). The patient was seen and examined. She is here for an annual visit. 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 Has 1 daughter-18- at [email or web address] in [place]. Meditates, drinks a lot of water. Trying to increase activity and working on eating healthy. She denies VB. Using NuvaRing continuously. Had one cycle in May. Her bowel habits are regular. She denies any bloating. She denies dysuria. She denies urinary leaking. She denies vaginal discharge. She is sexually active with single partner, contraception - NuvaRing vaginal inserts, denies dyspareunia and is not desiring pregnancy. Using NuvaRing for contraception. The patient denies any family member or herself as having a DVT or blood clotting disorder, cardiac disease (including HTN), risk for CVA disease/stroke and no hx of migraine with aura. Non-smoker if 28 or older. Aware of need to notify office with and changes in health that includes any of these dx.         REVIEW OF SYSTEMS:   Constitutional: negative fever, negative chills  HEENT: negative visual disturbances, negative headaches  Respiratory: negative dyspnea, negative cough  Cardiovascular: negative chest pain,  negative palpitations  Gastrointestinal: negative abdominal pain,  negative N/V, negative diarrhea, negative constipation  Genitourinary: negative dysuria, negative vaginal discharge  Dermatological: negative rash  Hematologic: negative bruising  Immunologic/Lymphatic: negative recent illness, negative recent sick contact  Musculoskeletal: negative back pain, negative myalgias, negative arthralgias  Neurological:  negative dizziness, negative tablet take 1 tablet by mouth every morning 12/18/20   Historical Provider, MD   diazePAM (VALIUM) 5 MG tablet take 1 tablet by mouth 1 HOUR BEFORE BED, THEN NIGHT BEFORE DENTA. ..  (REFER TO PRESCRIPTION NOTES). 12/21/20   Historical Provider, MD   fluocinolone (DERMA-SMOOTHE) 0.01 % external oil  12/21/20   Historical Provider, MD   Elastic Bandages & Supports ([address]) [address] 1 applicator by Does not apply route nightly RIGHT WRIST  Patient not taking: Reported on 1/18/2021 1/7/21   Jaime Busch MD   etonogestrel-ethinyl estradiol (NUVARING) 0.12-0.015 MG/24HR vaginal ring Place 1 each vaginally See Admin Instructions Insert one (1) ring vaginally and leave in place for three (3) weeks, then remove for one (1) week. 8/27/20 11/25/20  Bren Dos Santos MD   Ciclopirox 1 % SHAM  12/19/18   Historical Provider, MD   amphetamine-dextroamphetamine (ADDERALL) 15 MG tablet  12/18/18   Historical Provider, MD       FAMILY HISTORY:  Family History of Breast, Ovarian, Colon or Uterine Cancer: No   family history includes Asthma in her father; Diabetes in her maternal grandmother; Heart Attack in her maternal grandfather and maternal grandmother; No Known Problems in her mother, paternal grandfather, and paternal grandmother; Stroke in her maternal grandmother. SOCIAL HISTORY:   reports that she has never smoked. She has never used smokeless tobacco. She reports that she does not drink alcohol and does not use drugs. HEALTH MAINTENANCE:  Immunization status: stated as up to date, no records available  Gardasil NA    ROUTINE GYN HEALTH MAINTENANCE  Mammogram: overdue  Colonoscopy: NA  Pap Smears:  9.17.20  DEXA: NA                                                                                                                                                                                   PHYSICAL EXAM:   General Appearance: Appears healthy. Alert; in no acute distress. Pleasant.   Skin: Skin color, texture, turgor normal. No rashes or lesions. HEENT: normocephalic and atraumatic  Respiratory: Normal expansion. Normal effort  Cardiovascular: normal rate,   Breast:  (Chest): normal appearance, no masses or tenderness  Abdomen: soft, non-tender, non-distended,   Pelvic Exam:   Declines pelvic exam  Rectal Exam: exam declined by patient  Musculoskeletal: no gross abnormalities  Extremities: non-tender BLE and non-edematous  Psych:  oriented to time, place and person     DATA:  No results found for this visit on 10/07/21. ASSESSMENT & PLAN:    Renato Brady is a 42 y.o. female  No LMP recorded. (Menstrual status: Other - See Notes). Patient Active Problem List    Diagnosis Date Noted    Dermatitis 2021    Adjustment disorder with depressed mood 2016    Seborrheic dermatitis 2016    Gallstones 2016    Anxiety 2016    Stress 2016    Psychophysiological insomnia 2016       No follow-ups on file. No Patient Care Coordination Note on file. Counseling Completed:   Discussed need for annual exam   Discussed recommendations to repeat pap as per American Society for Colposcopy and Cervical Pathology guidelines. Discussed need for mammograms every 1 year, If >44 yo and last mammogram was negative. Discussed Calcium and Vitamin D dosing. Discussed need for colonoscopy screening as well as onset for bone density testing. Discussed birth control and barrier recommendations. Discussed STD counseling and prevention. Discussed Gardisil counseling for all patients 10-35 yo. Hereditary Breast, Ovarian, Colon and Uterine Cancer screening discussed. Tobacco & Secondary smoke risks discussed; with recommendation for cessation and avoidance. Routine health maintenance per patients PCP discussed. Diagnosis Orders   1. Encounter for gynecological examination  PAP SMEAR   2.  Encounter for screening mammogram for breast cancer  FABIÁN MARTINEZ DIGITAL SCREEN BILATERAL        [facility], [place]  10/6/2021, 3:20 PM

## 2021-10-07 ENCOUNTER — OFFICE VISIT (OUTPATIENT)
Dept: OBGYN CLINIC | Age: 41
End: 2021-10-07
Payer: COMMERCIAL

## 2021-10-07 VITALS
SYSTOLIC BLOOD PRESSURE: 122 MMHG | DIASTOLIC BLOOD PRESSURE: 86 MMHG | BODY MASS INDEX: 32.18 KG/M2 | WEIGHT: 224.8 LBS | HEIGHT: 70 IN

## 2021-10-07 DIAGNOSIS — Z01.419 ENCOUNTER FOR GYNECOLOGICAL EXAMINATION: Primary | ICD-10-CM

## 2021-10-07 DIAGNOSIS — Z12.31 ENCOUNTER FOR SCREENING MAMMOGRAM FOR BREAST CANCER: ICD-10-CM

## 2021-10-07 PROCEDURE — 99396 PREV VISIT EST AGE 40-64: CPT | Performed by: NURSE PRACTITIONER

## 2021-10-07 PROCEDURE — G8484 FLU IMMUNIZE NO ADMIN: HCPCS | Performed by: NURSE PRACTITIONER

## 2021-10-25 ENCOUNTER — HOSPITAL ENCOUNTER (OUTPATIENT)
Dept: WOMENS IMAGING | Age: 41
Discharge: HOME | End: 2021-10-27
Payer: COMMERCIAL

## 2021-10-25 DIAGNOSIS — Z12.31 ENCOUNTER FOR SCREENING MAMMOGRAM FOR BREAST CANCER: ICD-10-CM

## 2021-10-25 DIAGNOSIS — R92.8 ABNORMALITY OF LEFT BREAST ON SCREENING MAMMOGRAM: ICD-10-CM

## 2021-10-25 DIAGNOSIS — N63.23 MASS OF LOWER OUTER QUADRANT OF LEFT BREAST: Primary | ICD-10-CM

## 2021-10-25 PROCEDURE — 77063 BREAST TOMOSYNTHESIS BI: CPT

## 2021-10-25 NOTE — PROGRESS NOTES
10/25/21 abn mammogram finding, Scattered fibroglandular tissue. Ovoid mass in the posterior 5-6 o'clock left breast, 10 cm from the nipple is noted. No suspicious microcalcification or evidence for distortion. No abnormality identified in  the right breast.    Orders placed for diagnostic breast imaging.

## 2021-10-27 ENCOUNTER — HOSPITAL ENCOUNTER (OUTPATIENT)
Dept: WOMENS IMAGING | Age: 41
Discharge: HOME | End: 2021-10-29
Payer: COMMERCIAL

## 2021-10-27 DIAGNOSIS — N63.23 MASS OF LOWER OUTER QUADRANT OF LEFT BREAST: ICD-10-CM

## 2021-10-27 DIAGNOSIS — R92.8 ABNORMALITY OF LEFT BREAST ON SCREENING MAMMOGRAM: ICD-10-CM

## 2021-10-27 PROCEDURE — 76642 ULTRASOUND BREAST LIMITED: CPT

## 2021-10-28 DIAGNOSIS — N60.02 BREAST CYST, LEFT: Primary | ICD-10-CM

## 2022-01-05 RX ORDER — ETONOGESTREL AND ETHINYL ESTRADIOL 11.7; 2.7 MG/1; MG/1
INSERT, EXTENDED RELEASE VAGINAL
Qty: 4 EACH | Refills: 3 | Status: SHIPPED | OUTPATIENT
Start: 2022-01-05 | End: 2022-02-22 | Stop reason: SDUPTHER

## 2022-01-11 ENCOUNTER — HOSPITAL ENCOUNTER (OUTPATIENT)
Age: 42
Setting detail: SPECIMEN
Discharge: HOME | End: 2022-01-11

## 2022-01-11 ENCOUNTER — OFFICE VISIT (OUTPATIENT)
Dept: FAMILY MEDICINE CLINIC | Age: 42
End: 2022-01-11
Payer: COMMERCIAL

## 2022-01-11 VITALS
SYSTOLIC BLOOD PRESSURE: 144 MMHG | WEIGHT: 220.8 LBS | HEART RATE: 84 BPM | DIASTOLIC BLOOD PRESSURE: 95 MMHG | BODY MASS INDEX: 31.61 KG/M2 | TEMPERATURE: 96.8 F | HEIGHT: 70 IN

## 2022-01-11 DIAGNOSIS — Z11.59 ENCOUNTER FOR HEPATITIS C SCREENING TEST FOR LOW RISK PATIENT: ICD-10-CM

## 2022-01-11 DIAGNOSIS — I10 PRIMARY HYPERTENSION: ICD-10-CM

## 2022-01-11 DIAGNOSIS — E55.9 VITAMIN D DEFICIENCY: ICD-10-CM

## 2022-01-11 DIAGNOSIS — Z11.4 SCREENING FOR HUMAN IMMUNODEFICIENCY VIRUS: ICD-10-CM

## 2022-01-11 DIAGNOSIS — Z86.2 HX OF IRON DEFICIENCY ANEMIA: ICD-10-CM

## 2022-01-11 DIAGNOSIS — R63.5 WEIGHT GAIN: ICD-10-CM

## 2022-01-11 DIAGNOSIS — L40.9 PSORIASIS: Primary | ICD-10-CM

## 2022-01-11 DIAGNOSIS — Z00.00 ENCOUNTER FOR WELL ADULT EXAM WITHOUT ABNORMAL FINDINGS: ICD-10-CM

## 2022-01-11 LAB
ABSOLUTE EOS #: 0.15 K/UL (ref 0–0.4)
ABSOLUTE IMMATURE GRANULOCYTE: 0 K/UL (ref 0–0.3)
ABSOLUTE LYMPH #: 2.95 K/UL (ref 1–4.8)
ABSOLUTE MONO #: 0.35 K/UL (ref 0.1–0.8)
ANION GAP SERPL CALCULATED.3IONS-SCNC: 15 MMOL/L (ref 9–17)
BASOPHILS # BLD: 0 % (ref 0–2)
BASOPHILS ABSOLUTE: 0 K/UL (ref 0–0.2)
BUN BLDV-MCNC: 8 MG/DL (ref 6–20)
BUN/CREAT BLD: ABNORMAL (ref 9–20)
CALCIUM SERPL-MCNC: 9 MG/DL (ref 8.6–10.4)
CHLORIDE BLD-SCNC: 106 MMOL/L (ref 98–107)
CO2: 18 MMOL/L (ref 20–31)
CREAT SERPL-MCNC: 0.61 MG/DL (ref 0.5–0.9)
DIFFERENTIAL TYPE: ABNORMAL
EOSINOPHILS RELATIVE PERCENT: 3 % (ref 1–4)
GFR AFRICAN AMERICAN: >60 ML/MIN
GFR NON-AFRICAN AMERICAN: >60 ML/MIN
GFR SERPL CREATININE-BSD FRML MDRD: ABNORMAL ML/MIN/{1.73_M2}
GFR SERPL CREATININE-BSD FRML MDRD: ABNORMAL ML/MIN/{1.73_M2}
GLUCOSE BLD-MCNC: 96 MG/DL (ref 70–99)
HCT VFR BLD CALC: 42.9 % (ref 36.3–47.1)
HEMOGLOBIN: 13.2 G/DL (ref 11.9–15.1)
HEPATITIS C ANTIBODY: NONREACTIVE
HIV AG/AB: NONREACTIVE
IMMATURE GRANULOCYTES: 0 %
IRON SATURATION: 23 % (ref 20–55)
IRON: 81 UG/DL (ref 37–145)
LYMPHOCYTES # BLD: 59 % (ref 24–44)
MCH RBC QN AUTO: 24.2 PG (ref 25.2–33.5)
MCHC RBC AUTO-ENTMCNC: 30.8 G/DL (ref 28.4–34.8)
MCV RBC AUTO: 78.7 FL (ref 82.6–102.9)
MONOCYTES # BLD: 7 % (ref 1–7)
MORPHOLOGY: ABNORMAL
NRBC AUTOMATED: 0 PER 100 WBC
PDW BLD-RTO: 14.4 % (ref 11.8–14.4)
PLATELET # BLD: 197 K/UL (ref 138–453)
PLATELET ESTIMATE: ABNORMAL
PMV BLD AUTO: 12.3 FL (ref 8.1–13.5)
POTASSIUM SERPL-SCNC: 3.9 MMOL/L (ref 3.7–5.3)
RBC # BLD: 5.45 M/UL (ref 3.95–5.11)
RBC # BLD: ABNORMAL 10*6/UL
SEG NEUTROPHILS: 31 % (ref 36–66)
SEGMENTED NEUTROPHILS ABSOLUTE COUNT: 1.55 K/UL (ref 1.8–7.7)
SODIUM BLD-SCNC: 139 MMOL/L (ref 135–144)
TOTAL IRON BINDING CAPACITY: 358 UG/DL (ref 250–450)
TSH SERPL DL<=0.05 MIU/L-ACNC: 1.83 MIU/L (ref 0.3–5)
UNSATURATED IRON BINDING CAPACITY: 277 UG/DL (ref 112–347)
VITAMIN D 25-HYDROXY: 20.4 NG/ML (ref 30–100)
WBC # BLD: 5 K/UL (ref 3.5–11.3)
WBC # BLD: ABNORMAL 10*3/UL

## 2022-01-11 PROCEDURE — G8427 DOCREV CUR MEDS BY ELIG CLIN: HCPCS | Performed by: STUDENT IN AN ORGANIZED HEALTH CARE EDUCATION/TRAINING PROGRAM

## 2022-01-11 PROCEDURE — 90686 IIV4 VACC NO PRSV 0.5 ML IM: CPT | Performed by: FAMILY MEDICINE

## 2022-01-11 PROCEDURE — 99213 OFFICE O/P EST LOW 20 MIN: CPT | Performed by: STUDENT IN AN ORGANIZED HEALTH CARE EDUCATION/TRAINING PROGRAM

## 2022-01-11 PROCEDURE — 1036F TOBACCO NON-USER: CPT | Performed by: STUDENT IN AN ORGANIZED HEALTH CARE EDUCATION/TRAINING PROGRAM

## 2022-01-11 PROCEDURE — G8417 CALC BMI ABV UP PARAM F/U: HCPCS | Performed by: STUDENT IN AN ORGANIZED HEALTH CARE EDUCATION/TRAINING PROGRAM

## 2022-01-11 PROCEDURE — G8482 FLU IMMUNIZE ORDER/ADMIN: HCPCS | Performed by: STUDENT IN AN ORGANIZED HEALTH CARE EDUCATION/TRAINING PROGRAM

## 2022-01-11 RX ORDER — FLUOCINOLONE ACETONIDE 0.11 MG/ML
OIL TOPICAL
Qty: 1 EACH | Refills: 5 | Status: SHIPPED | OUTPATIENT
Start: 2022-01-11 | End: 2022-10-19

## 2022-01-11 RX ORDER — MOMETASONE FUROATE 1 MG/ML
SOLUTION TOPICAL
Qty: 1 EACH | Refills: 5 | Status: SHIPPED | OUTPATIENT
Start: 2022-01-11

## 2022-01-11 RX ORDER — CICLOPIROX 1 G/100ML
1 SHAMPOO TOPICAL PRN
Qty: 1 EACH | Refills: 5 | Status: SHIPPED | OUTPATIENT
Start: 2022-01-11

## 2022-01-11 ASSESSMENT — ENCOUNTER SYMPTOMS
COUGH: 0
SINUS PRESSURE: 0
ABDOMINAL PAIN: 0
DIARRHEA: 0
SORE THROAT: 0
WHEEZING: 0
SHORTNESS OF BREATH: 0
CHEST TIGHTNESS: 0
COLOR CHANGE: 0
ANAL BLEEDING: 0
SINUS PAIN: 0
ABDOMINAL DISTENTION: 0
NAUSEA: 0

## 2022-01-11 ASSESSMENT — PATIENT HEALTH QUESTIONNAIRE - PHQ9
10. IF YOU CHECKED OFF ANY PROBLEMS, HOW DIFFICULT HAVE THESE PROBLEMS MADE IT FOR YOU TO DO YOUR WORK, TAKE CARE OF THINGS AT HOME, OR GET ALONG WITH OTHER PEOPLE: 2
SUM OF ALL RESPONSES TO PHQ QUESTIONS 1-9: 17
SUM OF ALL RESPONSES TO PHQ QUESTIONS 1-9: 17
8. MOVING OR SPEAKING SO SLOWLY THAT OTHER PEOPLE COULD HAVE NOTICED. OR THE OPPOSITE, BEING SO FIGETY OR RESTLESS THAT YOU HAVE BEEN MOVING AROUND A LOT MORE THAN USUAL: 0
7. TROUBLE CONCENTRATING ON THINGS, SUCH AS READING THE NEWSPAPER OR WATCHING TELEVISION: 3
SUM OF ALL RESPONSES TO PHQ QUESTIONS 1-9: 17
DEPRESSION UNABLE TO ASSESS: FUNCTIONAL CAPACITY MOTIVATION LIMITS ACCURACY
9. THOUGHTS THAT YOU WOULD BE BETTER OFF DEAD, OR OF HURTING YOURSELF: 0
5. POOR APPETITE OR OVEREATING: 1
1. LITTLE INTEREST OR PLEASURE IN DOING THINGS: 3
6. FEELING BAD ABOUT YOURSELF - OR THAT YOU ARE A FAILURE OR HAVE LET YOURSELF OR YOUR FAMILY DOWN: 3
SUM OF ALL RESPONSES TO PHQ9 QUESTIONS 1 & 2: 6
3. TROUBLE FALLING OR STAYING ASLEEP: 1
4. FEELING TIRED OR HAVING LITTLE ENERGY: 3
SUM OF ALL RESPONSES TO PHQ QUESTIONS 1-9: 17
2. FEELING DOWN, DEPRESSED OR HOPELESS: 3

## 2022-01-11 NOTE — PROGRESS NOTES
Subjective:    Scott Ramirez is a 42 y.o. female with  has a past medical history of Abnormal Pap smear of cervix, Anxiety, Breast cyst, left, Depression, GERD (gastroesophageal reflux disease), and Headache. Presented to the office today for:  Chief Complaint   Patient presents with    Annual Exam     annual exam, labs flu shot       HPI    Patient is a 50-year-old female with past medical history of psoriasis. She is presenting today for follow-up of elevated blood pressure readings that was noted during her psychiatrist visit. Blood pressure reading today was 154/99, repeat 144/95. Patient had 1 elevated blood pressure reading in the past in February 2021 which was 142/98. Patient has never been treated with any medications for blood pressure. Patient has also recently lost her mother as well as her job. She is going through significant stress in her life. Patient appears depressed, PHQ-9 score 17. Denies any suicidal homicidal ideation  Patient does not want to be started on blood pressure medications and wants to manage it with lifestyle modifications at this time. She is also requesting some blood work today. Review of Systems   Constitutional: Negative for fatigue and fever. HENT: Negative for sinus pressure, sinus pain, sore throat and tinnitus. Eyes: Negative for visual disturbance. Respiratory: Negative for cough, chest tightness, shortness of breath and wheezing. Cardiovascular: Negative for chest pain, palpitations and leg swelling. Gastrointestinal: Negative for abdominal distention, abdominal pain, anal bleeding, diarrhea and nausea. Genitourinary: Negative for enuresis, frequency and urgency. Musculoskeletal: Negative for arthralgias, gait problem and neck stiffness. Skin: Negative for color change and rash. Neurological: Negative for dizziness and headaches. Psychiatric/Behavioral: Positive for decreased concentration and sleep disturbance.  Negative for agitation, confusion and suicidal ideas. The patient has a   Family History   Problem Relation Age of Onset    No Known Problems Mother     Asthma Father     Diabetes Maternal Grandmother         IDDM    Heart Attack Maternal Grandmother     Stroke Maternal Grandmother     Heart Attack Maternal Grandfather     No Known Problems Paternal Grandmother     No Known Problems Paternal Grandfather        Objective:    BP (!) 144/95 (Site: Left Upper Arm, Position: Sitting, Cuff Size: Large Adult)   Pulse 84   Temp 96.8 °F (36 °C) (Temporal)   Ht 5' 10\" (1.778 m)   Wt 220 lb 12.8 oz (100.2 kg)   BMI 31.68 kg/m²    BP Readings from Last 3 Encounters:   01/11/22 (!) 144/95   10/07/21 122/86   02/18/21 (!) 142/98       Physical Exam  Constitutional:       Appearance: Normal appearance. HENT:      Head: Atraumatic. Mouth/Throat:      Mouth: Mucous membranes are moist.      Pharynx: No oropharyngeal exudate or posterior oropharyngeal erythema. Eyes:      Extraocular Movements: Extraocular movements intact. Cardiovascular:      Rate and Rhythm: Normal rate and regular rhythm. Heart sounds: No murmur heard. No friction rub. No gallop. Pulmonary:      Effort: Pulmonary effort is normal.      Breath sounds: No wheezing, rhonchi or rales. Abdominal:      General: Abdomen is flat. Tenderness: There is no abdominal tenderness. There is no guarding. Hernia: No hernia is present. Musculoskeletal:         General: No swelling or tenderness. Normal range of motion. Cervical back: Normal range of motion. Skin:     General: Skin is warm. Capillary Refill: Capillary refill takes less than 2 seconds. Coloration: Skin is not jaundiced. Findings: No bruising. Neurological:      Mental Status: She is alert and oriented to person, place, and time.          Lab Results   Component Value Date    WBC 5.8 04/05/2019    HGB 12.6 04/05/2019    HCT 42.5 04/05/2019  04/05/2019    CHOL 132 04/05/2019    TRIG 71 04/05/2019    HDL 58 04/05/2019    ALT 20 04/05/2019    AST 21 04/05/2019     01/14/2021    K 3.9 01/14/2021     01/14/2021    CREATININE 0.64 01/14/2021    BUN 8 01/14/2021    CO2 20 01/14/2021    TSH 0.93 01/14/2021     Lab Results   Component Value Date    CALCIUM 9.2 01/14/2021     Lab Results   Component Value Date    LDLCHOLESTEROL 60 04/05/2019       Assessment and Plan:    1. Psoriasis  - Ciclopirox 1 % SHAM; Apply 1 each topically as needed (use as needed)  Dispense: 1 each; Refill: 5  - fluocinolone (DERMA-SMOOTHE) 0.01 % external oil; Use as needed  Dispense: 1 each; Refill: 5  - mometasone (ELOCON) 0.1 % lotion; Apply topically daily. Dispense: 1 each; Refill: 5    2. Vitamin D deficiency  - Vitamin D 25 Hydroxy; Future    3. Primary hypertension  -Blood pressure 144/95  - Patient does not want to be started on any blood pressure medications today, would like to do lifestyle modifications. - Basic Metabolic Panel; Future  - CBC With Auto Differential; Future  - TSH With Reflex Ft4; Future    4. Weight gain  - TSH With Reflex Ft4; Future    5. Screening for human immunodeficiency virus  - HIV Screen; Future    6. Encounter for hepatitis C screening test for low risk patient  - Hepatitis C Antibody; Future    7. Hx of iron deficiency anemia  - Iron And TIBC; Future          Requested Prescriptions     Signed Prescriptions Disp Refills    Ciclopirox 1 % SHAM 1 each 5     Sig: Apply 1 each topically as needed (use as needed)    fluocinolone (DERMA-SMOOTHE) 0.01 % external oil 1 each 5     Sig: Use as needed    mometasone (ELOCON) 0.1 % lotion 1 each 5     Sig: Apply topically daily.        Medications Discontinued During This Encounter   Medication Reason    methylPREDNISolone (MEDROL DOSEPACK) 4 MG tablet LIST CLEANUP    Ciclopirox 1 % SHAM REORDER    fluocinolone (DERMA-SMOOTHE) 0.01 % external oil REORDER       [facility] counseling on the following healthy behaviors: nutrition, exercise and medication adherence    Discussed use,benefit, and side effects of prescribed medications. Barriers to medication compliance addressed. All patient questions answered. Pt voiced understanding. No follow-ups on file. Disclaimer: Some orall of this note was transcribed using voice-recognition software. This may cause typographical errors occasionally. Although all effort is made to fix these errors, please do not hesitate to contact our office if there Hillary Dickerson concern with the understanding of this note.

## 2022-01-13 NOTE — PROGRESS NOTES
Attending Physician Statement    Wt Readings from Last 3 Encounters:   01/11/22 220 lb 12.8 oz (100.2 kg)   10/07/21 224 lb 12.8 oz (102 kg)   01/18/21 221 lb 12.8 oz (100.6 kg)     Temp Readings from Last 3 Encounters:   01/11/22 96.8 °F (36 °C) (Temporal)   01/18/21 97.1 °F (36.2 °C) (Temporal)   01/07/21 98.1 °F (36.7 °C) (Temporal)     BP Readings from Last 3 Encounters:   01/11/22 (!) 144/95   10/07/21 122/86   02/18/21 (!) 142/98     Pulse Readings from Last 3 Encounters:   01/11/22 84   02/18/21 116   01/18/21 95         I have discussed the care of Ada Bruce, including pertinent history and exam findings with the resident. I have reviewed the key elements of all parts of the encounter with the resident. I agree with the assessment, plan and orders as documented by the resident.   (GE Modifier)

## 2022-02-08 ENCOUNTER — OFFICE VISIT (OUTPATIENT)
Dept: FAMILY MEDICINE CLINIC | Age: 42
End: 2022-02-08
Payer: COMMERCIAL

## 2022-02-08 DIAGNOSIS — F51.04 PSYCHOPHYSIOLOGICAL INSOMNIA: Primary | ICD-10-CM

## 2022-02-08 PROCEDURE — 96165 HLTH BHV IVNTJ GRP EA ADDL: CPT | Performed by: PSYCHOLOGIST

## 2022-02-08 PROCEDURE — 96164 HLTH BHV IVNTJ GRP 1ST 30: CPT | Performed by: PSYCHOLOGIST

## 2022-02-08 NOTE — PROGRESS NOTES
Time: 9:40-10:30    Provider met with patient for educational group before being scheduled with provider. Provided psycho-education about depression, anxiety, the stress response, sleep, exercise, and self-care. Engaged in relaxation exercise. Pt. did sign consent form after being provided with information about brief approach to therapy and confidentiality. Patient did schedule individual appointment with provider following group.

## 2022-02-14 ENCOUNTER — OFFICE VISIT (OUTPATIENT)
Dept: FAMILY MEDICINE CLINIC | Age: 42
End: 2022-02-14
Payer: COMMERCIAL

## 2022-02-14 DIAGNOSIS — F43.23 ADJUSTMENT DISORDER WITH MIXED ANXIETY AND DEPRESSED MOOD: Primary | ICD-10-CM

## 2022-02-14 PROCEDURE — 90791 PSYCH DIAGNOSTIC EVALUATION: CPT | Performed by: PSYCHOLOGIST

## 2022-02-14 PROCEDURE — 1036F TOBACCO NON-USER: CPT | Performed by: PSYCHOLOGIST

## 2022-02-14 NOTE — PROGRESS NOTES
Sima Horton was seen for 1st session with Karina Ahumada, PhD.  Previously reviewed consent for treatment with regard to limits of confidentiality and brief approach to therapy. Patient was last seen by Dr. Marvin Pelletier. 2/14/2022   Time:   9:50am - 10:35 am    Patient arrived 20 minutes late for appointment. Chief Complaint:  Dissatisfaction with life due to difficulty with consistently following though on goals, motivation is up and down, patient anxious about and grieving the loss of her mother from Nov 2021, patient feels she is a a crossroads    She has the following symptoms: grief and sandess due to losing her mom, anhedonia, depressed mood, difficulty concentrating and withdrawn, isolated, anxious, lacking follow through, and increased sedentary behavior. Onset of symptoms was approximately 1 year ago. Symptoms have been worse since her mom's sudden death Nov 2021. Psych History:  Patient reports having therapy a few years ago and it was helpful to her. Encounter with Dr. Marvin Pelletier mentions that patient sees a psychiatrist.  Patient did not disclose meeting with a psychiatrist currently. Will continue to get more background information. Family History:   Patient is the oldest of 3 daughters who are close in age. They get along well. She is also close with her father. They are all grieving loss of her mom. Dad lives in [place] [id number]. Patient has 23year old daughter at [facility] [employer] in [place]. Will continue to get more background information re family history. Trauma History:  Patient denies traumatic events in her life. Social Support:  Patient has great social support from friends, family, Nondenominational, and sorority sisters. 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 She has a college degree from [employer] and is taking some courses on [employer] currently.   She will be looking for new work but planned to take 6 months off. Substance Use:  Use Tobacco:  no  Use Alcohol:  yes - social drinker infrequent use  Use Marijuana:  no  Use other substances:  no    Mental Status:  Patient cooperative and a good historian. Patient was easy to engage. She  related well. Eye contact was good. Activity level is normal.  Thought processes were intact. Judgment and insight were good. Mood was pleasant and affect was appropriate to content. Patient has many strengths with regard to being self-reflective and thoughtful. She is motivated for her own personal development and health. Patient denies having suicidal ideation. Patient denies having homicidal ideation. Diagnosis:     Diagnosis Orders   1. Adjustment disorder with mixed anxiety and depressed mood        R/O Persistent Depressive Disorder      Plan:  Support patient in grief process. Address anxiety and depression related to life stage and facilitate patient in coming up with a plan for next steps in her life and following through on plan. Monitor motivation and follow through on goals. Continue to assess symptoms, gather more history, and problem solve around current stressors. Return in about 2 weeks (around 2/28/2022).

## 2022-02-21 ENCOUNTER — TELEPHONE (OUTPATIENT)
Dept: FAMILY MEDICINE CLINIC | Age: 42
End: 2022-02-21

## 2022-02-21 RX ORDER — LANOLIN ALCOHOL/MO/W.PET/CERES
CREAM (GRAM) TOPICAL
Qty: 454 G | Refills: 0 | Status: SHIPPED | OUTPATIENT
Start: 2022-02-21 | End: 2022-06-29

## 2022-02-21 NOTE — TELEPHONE ENCOUNTER
Pharmacy sent over request for Survela, wouldn't allow me to pend medication. Please address the medication refill and close the encounter. If I can be of assistance, please route to the applicable pool. Thank you.           Last visit: 1/11/22  Last Med refill: 6-  Does patient have enough medication for 72 hours: No:     Next Visit Date:  Future Appointments   Date Time Provider Luly Pineda   3/4/2022  1:30 PM Caroline Fernandez, PhD Yesi Quach Maintenance   Topic Date Due    COVID-19 Vaccine (1) Never done    Depression Monitoring  01/11/2023    Lipid screen  04/05/2024    Cervical cancer screen  09/17/2025    DTaP/Tdap/Td vaccine (2 - Td or Tdap) 02/19/2028    Flu vaccine  Completed    Hepatitis C screen  Completed    HIV screen  Completed    Hepatitis A vaccine  Aged Out    Hepatitis B vaccine  Aged Out    Hib vaccine  Aged Out    Meningococcal (ACWY) vaccine  Aged Out    Pneumococcal 0-64 years Vaccine  Aged Out       No results found for: LABA1C          ( goal A1C is < 7)   No results found for: LABMICR  LDL Cholesterol (mg/dL)   Date Value   04/05/2019 60       (goal LDL is <100)   AST (U/L)   Date Value   04/05/2019 21     ALT (U/L)   Date Value   04/05/2019 20     BUN (mg/dL)   Date Value   01/11/2022 8     BP Readings from Last 3 Encounters:   01/11/22 (!) 144/95   10/07/21 122/86   02/18/21 (!) 142/98          (goal 120/80)    All Future Testing planned in [facility]  Lab Frequency Next Occurrence   FABIÁN DIGITAL DIAGNOSTIC W OR WO CAD LEFT Once 10/25/2021   US BREAST LIMITED LEFT Once 03/15/2022               Patient Active Problem List:     Gallstones     Anxiety     Stress     Psychophysiological insomnia     Adjustment disorder with depressed mood     Seborrheic dermatitis     Dermatitis

## 2022-02-22 ENCOUNTER — TELEPHONE (OUTPATIENT)
Dept: OBGYN CLINIC | Age: 42
End: 2022-02-22

## 2022-02-22 RX ORDER — ETONOGESTREL AND ETHINYL ESTRADIOL 11.7; 2.7 MG/1; MG/1
INSERT, EXTENDED RELEASE VAGINAL
Qty: 4 EACH | Refills: 3 | Status: SHIPPED | OUTPATIENT
Start: 2022-02-22

## 2022-02-22 NOTE — TELEPHONE ENCOUNTER
Pt called she needs a refill on her BC nuvaring she is wondering if the script can be changed to 3 weeks instead of monthly to change it and if it can be a 60 day refill .  She will schedule for an annual. Please advise

## 2022-03-04 ENCOUNTER — OFFICE VISIT (OUTPATIENT)
Dept: FAMILY MEDICINE CLINIC | Age: 42
End: 2022-03-04
Payer: COMMERCIAL

## 2022-03-04 DIAGNOSIS — F43.23 ADJUSTMENT DISORDER WITH MIXED ANXIETY AND DEPRESSED MOOD: Primary | ICD-10-CM

## 2022-03-04 PROCEDURE — 1036F TOBACCO NON-USER: CPT | Performed by: PSYCHOLOGIST

## 2022-03-04 PROCEDURE — 90832 PSYTX W PT 30 MINUTES: CPT | Performed by: PSYCHOLOGIST

## 2022-03-04 NOTE — PROGRESS NOTES
Lexii Douglas was seen for follow up session with Davina Washington, PhD.     This is patient's second appointment. 3/4/2022  Time: 2:06-2:28    Content of Session:  Patient arrived 45 minutes late for session, so we had shortened session. Followed up on her follow through with goals over past few weeks. She has gotten to the gym about 2x a week. Her goal is to get there 4 x a week. She was able to recognize that she has accomplished 50% of her goal and give herself credit for that. Explored what will help her get there 4x per week and what gets in the way. Discussed short versus long term goals, values, and barriers to consistency. Showed her ACT workbook and recommended it to her as she likes reading and learning. Also checked in about grief with loss of mom. Identiified thoughts that are helpful and unhelpful to accomplishing her goals. Assessment:  Patient able to engage in balanced thinking, She has followed through on some of her goals. She continues to want help with long term goals. PHQ Scores 1/11/2022 1/7/2021 2/26/2019 2/19/2018 12/28/2016   PHQ2 Score 6 0 0 0 2   PHQ9 Score 17 0 0 0 2     Interpretation of Total Score Depression Severity: 1-4 = Minimal depression, 5-9 = Mild depression, 10-14 = Moderate depression, 15-19 = Moderately severe depression, 20-27 = Severe depression       Diagnosis:   Diagnosis Orders   1. Adjustment disorder with mixed anxiety and depressed mood          INTERVENTION  Identified maladaptive thoughts/negative self talk., Worked on /reinforced balanced thinking and Problem-solving re: follow through on goals. Plan:  Patient to return in about 4 weeks to continue to work on goals and decrease symptoms of anxiety and depression. Continue to monitor and provide support to patient as she grieves the loss of her mom.

## 2022-04-08 ENCOUNTER — OFFICE VISIT (OUTPATIENT)
Dept: FAMILY MEDICINE CLINIC | Age: 42
End: 2022-04-08
Payer: COMMERCIAL

## 2022-04-08 DIAGNOSIS — F43.23 ADJUSTMENT DISORDER WITH MIXED ANXIETY AND DEPRESSED MOOD: Primary | ICD-10-CM

## 2022-04-08 PROCEDURE — 90834 PSYTX W PT 45 MINUTES: CPT | Performed by: PSYCHOLOGIST

## 2022-04-08 PROCEDURE — 1036F TOBACCO NON-USER: CPT | Performed by: PSYCHOLOGIST

## 2022-04-08 NOTE — PROGRESS NOTES
Hank Chappell was seen for follow up session with Delmer Ortiz, PhD.     This is patient's third appointment. 4/8/2022  Time: 1:43 pm- 2:30 pm    Content of Session:   Discussed patient balancing giving herself time to process everything while also trying to set goals and stay motivated. It is easy for her to stay motivated to do coursera as she loves learning; it is harder to  stay motivated with other things. Exercise routine okay but she isn't at her goal of 4x per week. It continues to be about once a week with one workout at home now. She has initiated new rule of no tv until noon to prevent herself from watching all day. Discussed patient experiencing 3 deaths in her family over 2 weeks with mom in the middle,aunt first, and cousin last.  Discussed familydynamics around mom's death. Mom didn't tell people she was sick, no one thought she would die. Patient went with Cathy Mercado to [facility] for spring break. Cathy Mercado will be home this summer. Patient to get ACT book. Patient shows mostly healthy self talk but delineating values to guide her goals and support her motivation could be addressed with ACT workbook. Patient experiencing grief and sadness. Her mom was very present in her life so her absence is keenly felt. Assessment:  Patient stable but continuing to struggle with depression, grief, and lack of motivation. PHQ Scores 1/11/2022 1/7/2021 2/26/2019 2/19/2018 12/28/2016   PHQ2 Score 6 0 0 0 2   PHQ9 Score 17 0 0 0 2     Interpretation of Total Score Depression Severity: 1-4 = Minimal depression, 5-9 = Mild depression, 10-14 = Moderate depression, 15-19 = Moderately severe depression, 20-27 = Severe depression    How often pt has had thoughts of death or hurting self (if PHQ positive for depression):       No flowsheet data found. Interpretation of RANDALL-7 score: 5-9 = mild anxiety, 10-14 = moderate anxiety, 15+ = severe anxiety.  Recommend referral to behavioral health for scores 10 or greater. Diagnosis:   Diagnosis Orders   1. Adjustment disorder with mixed anxiety and depressed mood          INTERVENTION  Supportive techniques/ validated feelings/ provided empathy and compassion/developed rapport, Related past to present, Reinforced self-care , Worked on /reinforced balanced thinking, Identified and reflected feelings. and Reinforced healthy behaviors. Plan:  Return in 4 weeks to continue to support patient, decrease symptoms, monitor and address motivation, help patient process grief reactions, and facilitate coping with stressors.

## 2022-04-19 ENCOUNTER — OFFICE VISIT (OUTPATIENT)
Dept: FAMILY MEDICINE CLINIC | Age: 42
End: 2022-04-19
Payer: COMMERCIAL

## 2022-04-19 VITALS
WEIGHT: 239 LBS | DIASTOLIC BLOOD PRESSURE: 88 MMHG | SYSTOLIC BLOOD PRESSURE: 139 MMHG | BODY MASS INDEX: 34.29 KG/M2 | HEART RATE: 85 BPM

## 2022-04-19 DIAGNOSIS — Z13.1 DIABETES MELLITUS SCREENING: ICD-10-CM

## 2022-04-19 DIAGNOSIS — R63.4 WEIGHT LOSS: ICD-10-CM

## 2022-04-19 DIAGNOSIS — I10 PRIMARY HYPERTENSION: Primary | ICD-10-CM

## 2022-04-19 DIAGNOSIS — E55.9 VITAMIN D DEFICIENCY: ICD-10-CM

## 2022-04-19 LAB
CONTROL: NORMAL
HBA1C MFR BLD: 5.3 %
PREGNANCY TEST URINE, POC: NEGATIVE

## 2022-04-19 PROCEDURE — 99213 OFFICE O/P EST LOW 20 MIN: CPT | Performed by: STUDENT IN AN ORGANIZED HEALTH CARE EDUCATION/TRAINING PROGRAM

## 2022-04-19 PROCEDURE — 81025 URINE PREGNANCY TEST: CPT | Performed by: STUDENT IN AN ORGANIZED HEALTH CARE EDUCATION/TRAINING PROGRAM

## 2022-04-19 PROCEDURE — 83036 HEMOGLOBIN GLYCOSYLATED A1C: CPT | Performed by: STUDENT IN AN ORGANIZED HEALTH CARE EDUCATION/TRAINING PROGRAM

## 2022-04-19 PROCEDURE — G8417 CALC BMI ABV UP PARAM F/U: HCPCS | Performed by: STUDENT IN AN ORGANIZED HEALTH CARE EDUCATION/TRAINING PROGRAM

## 2022-04-19 PROCEDURE — 1036F TOBACCO NON-USER: CPT | Performed by: STUDENT IN AN ORGANIZED HEALTH CARE EDUCATION/TRAINING PROGRAM

## 2022-04-19 PROCEDURE — G8427 DOCREV CUR MEDS BY ELIG CLIN: HCPCS | Performed by: STUDENT IN AN ORGANIZED HEALTH CARE EDUCATION/TRAINING PROGRAM

## 2022-04-19 RX ORDER — AMLODIPINE BESYLATE 5 MG/1
5 TABLET ORAL DAILY
Qty: 30 TABLET | Refills: 3 | Status: SHIPPED | OUTPATIENT
Start: 2022-04-19

## 2022-04-19 RX ORDER — VITAMIN B COMPLEX
1000 TABLET ORAL DAILY
Qty: 60 TABLET | Refills: 5 | Status: SHIPPED | OUTPATIENT
Start: 2022-04-19

## 2022-04-19 ASSESSMENT — ENCOUNTER SYMPTOMS
ABDOMINAL PAIN: 0
NAUSEA: 0
CHEST TIGHTNESS: 0
DIARRHEA: 0
ANAL BLEEDING: 0
WHEEZING: 0
SORE THROAT: 0
COLOR CHANGE: 0
SHORTNESS OF BREATH: 0
ABDOMINAL DISTENTION: 0
SINUS PRESSURE: 0
COUGH: 0
SINUS PAIN: 0

## 2022-04-19 ASSESSMENT — PATIENT HEALTH QUESTIONNAIRE - PHQ9
7. TROUBLE CONCENTRATING ON THINGS, SUCH AS READING THE NEWSPAPER OR WATCHING TELEVISION: 3
8. MOVING OR SPEAKING SO SLOWLY THAT OTHER PEOPLE COULD HAVE NOTICED. OR THE OPPOSITE, BEING SO FIGETY OR RESTLESS THAT YOU HAVE BEEN MOVING AROUND A LOT MORE THAN USUAL: 0
2. FEELING DOWN, DEPRESSED OR HOPELESS: 1
4. FEELING TIRED OR HAVING LITTLE ENERGY: 3
SUM OF ALL RESPONSES TO PHQ QUESTIONS 1-9: 15
1. LITTLE INTEREST OR PLEASURE IN DOING THINGS: 3
SUM OF ALL RESPONSES TO PHQ QUESTIONS 1-9: 15
9. THOUGHTS THAT YOU WOULD BE BETTER OFF DEAD, OR OF HURTING YOURSELF: 0
6. FEELING BAD ABOUT YOURSELF - OR THAT YOU ARE A FAILURE OR HAVE LET YOURSELF OR YOUR FAMILY DOWN: 2
SUM OF ALL RESPONSES TO PHQ9 QUESTIONS 1 & 2: 4
SUM OF ALL RESPONSES TO PHQ QUESTIONS 1-9: 15
10. IF YOU CHECKED OFF ANY PROBLEMS, HOW DIFFICULT HAVE THESE PROBLEMS MADE IT FOR YOU TO DO YOUR WORK, TAKE CARE OF THINGS AT HOME, OR GET ALONG WITH OTHER PEOPLE: 0
5. POOR APPETITE OR OVEREATING: 3
SUM OF ALL RESPONSES TO PHQ QUESTIONS 1-9: 15
3. TROUBLE FALLING OR STAYING ASLEEP: 0

## 2022-04-19 NOTE — PROGRESS NOTES
Subjective:    Maryam Powers is a 42 y.o. female with  has a past medical history of Abnormal Pap smear of cervix, Anxiety, Breast cyst, left, Depression, GERD (gastroesophageal reflux disease), and Headache. Presented to the office today for:  Chief Complaint   Patient presents with    Weight Gain     questions on medication    Hypertension     lab results       HPI  Patient is a 44-year-old female with past medical history of anxiety, depression, weight gain. She is presenting today for follow-up for weight gain. Patient has gained close to 20 pounds since January. Weight gain has been unintentional in nature. Patient has tried multiple lifestyle modifications including 60 minutes of exercise on a daily basis which has not provided any improvement. Blood work has been unremarkable. She is deficient in vitamin D which is being replaced. Patient also had elevated blood pressure during her last visit. Patient states that her multiple visits to her specialist they have noticed that she has high blood pressure. Patient however has not been on any medication so far. Her home blood pressure monitor shows that she has high readings in the 160s. Today in the office it is normal.        Review of Systems   Constitutional: Positive for unexpected weight change. Negative for fatigue and fever. HENT: Negative for sinus pressure, sinus pain, sore throat and tinnitus. Eyes: Negative for visual disturbance. Respiratory: Negative for cough, chest tightness, shortness of breath and wheezing. Cardiovascular: Negative for chest pain, palpitations and leg swelling. Gastrointestinal: Negative for abdominal distention, abdominal pain, anal bleeding, diarrhea and nausea. Genitourinary: Negative for enuresis, frequency and urgency. Musculoskeletal: Negative for arthralgias, gait problem and neck stiffness. Skin: Negative for color change and rash.    Neurological: Negative for dizziness and headaches. Psychiatric/Behavioral: Negative for agitation and confusion. The patient has a   Family History   Problem Relation Age of Onset    No Known Problems Mother     Asthma Father     Diabetes Maternal Grandmother         IDDM    Heart Attack Maternal Grandmother     Stroke Maternal Grandmother     Heart Attack Maternal Grandfather     No Known Problems Paternal Grandmother     No Known Problems Paternal Grandfather        Objective:    /88 (Site: Left Upper Arm, Position: Sitting, Cuff Size: Medium Adult)   Pulse 85   Wt 239 lb (108.4 kg)   BMI 34.29 kg/m²    BP Readings from Last 3 Encounters:   04/19/22 139/88   01/11/22 (!) 144/95   10/07/21 122/86       Physical Exam  Constitutional:       Appearance: Normal appearance. HENT:      Head: Atraumatic. Mouth/Throat:      Mouth: Mucous membranes are moist.      Pharynx: No oropharyngeal exudate or posterior oropharyngeal erythema. Eyes:      Extraocular Movements: Extraocular movements intact. Cardiovascular:      Rate and Rhythm: Normal rate and regular rhythm. Heart sounds: No murmur heard. No friction rub. No gallop. Pulmonary:      Effort: Pulmonary effort is normal.      Breath sounds: No wheezing, rhonchi or rales. Abdominal:      General: Abdomen is flat. Tenderness: There is no abdominal tenderness. There is no guarding. Hernia: No hernia is present. Musculoskeletal:         General: No swelling or tenderness. Normal range of motion. Cervical back: Normal range of motion. Skin:     General: Skin is warm. Capillary Refill: Capillary refill takes less than 2 seconds. Coloration: Skin is not jaundiced. Findings: No bruising. Neurological:      Mental Status: She is alert and oriented to person, place, and time.          Lab Results   Component Value Date    WBC 5.0 01/11/2022    HGB 13.2 01/11/2022    HCT 42.9 01/11/2022     01/11/2022    CHOL 132 04/05/2019 TRIG 71 04/05/2019    HDL 58 04/05/2019    ALT 20 04/05/2019    AST 21 04/05/2019     01/11/2022    K 3.9 01/11/2022     01/11/2022    CREATININE 0.61 01/11/2022    BUN 8 01/11/2022    CO2 18 (L) 01/11/2022    TSH 1.83 01/11/2022    LABA1C 5.3 04/19/2022     Lab Results   Component Value Date    CALCIUM 9.0 01/11/2022     Lab Results   Component Value Date    LDLCHOLESTEROL 60 04/05/2019       Assessment and Plan:    1. Primary hypertension  -We will start on low-dose Norvasc as patient had multiple elevated blood pressure readings. - amLODIPine (NORVASC) 5 MG tablet; Take 1 tablet by mouth daily  Dispense: 30 tablet; Refill: 3    2. Weight loss  -Patient has tried multiple lifestyle modifications and dietary changes in the past which has provided no improvement in the symptoms of weight gain. Today due to the increased weight gain and BMI of 34.29 patient has been undergoing significant stress in terms of day-to-day life. At this time we will try [facility] ANAID for weight loss. We will give patient 0.25 weekly injections to begin with and will titrate up if necessary.  - Semaglutide-Weight Management (WEGOVY) 0.25 MG/0.5ML SOAJ SC injection; Inject 0.25 mg into the skin every 7 days  Dispense: 0.5 mL; Refill: 3  - POCT urine pregnancy    3. Diabetes mellitus screening  - POCT glycosylated hemoglobin (Hb A1C)-5.3    4. BMI 34.0-34.9,adult  - Semaglutide-Weight Management (WEGOVY) 0.25 MG/0.5ML SOAJ SC injection; Inject 0.25 mg into the skin every 7 days  Dispense: 0.5 mL; Refill: 3    5. Vitamin D deficiency  - Vitamin D (CHOLECALCIFEROL) 25 MCG (1000 UT) TABS tablet; Take 1 tablet by mouth daily  Dispense: 60 tablet;  Refill: 5          Requested Prescriptions     Signed Prescriptions Disp Refills    Semaglutide-Weight Management (WEGOVY) 0.25 MG/0.5ML SOAJ SC injection 0.5 mL 3     Sig: Inject 0.25 mg into the skin every 7 days    amLODIPine (NORVASC) 5 MG tablet 30 tablet 3     Sig: Take 1 tablet by mouth daily    Vitamin D (CHOLECALCIFEROL) 25 MCG (1000 UT) TABS tablet 60 tablet 5     Sig: Take 1 tablet by mouth daily       There are no discontinued medications. Lopez received counseling on the following healthy behaviors: nutrition, exercise and medication adherence    Discussed use,benefit, and side effects of prescribed medications. Barriers to medication compliance addressed. All patient questions answered. Pt voiced understanding. Return in about 2 weeks (around 5/3/2022) for weight loss medication monitoring. Disclaimer: Some orall of this note was transcribed using voice-recognition software. This may cause typographical errors occasionally. Although all effort is made to fix these errors, please do not hesitate to contact our office if there Patsi Favorite concern with the understanding of this note.

## 2022-04-19 NOTE — PATIENT INSTRUCTIONS
Thank you for letting us take care of you today. We hope all your questions were addressed. If a question was overlooked or something else comes to mind after you return home, please contact a member of your Care Team listed below. Your Care Team at [place] [address] is Team #4  Aleta Cartwright MD (Faculty)  Julianna Fung MD (Resident)  Seth Alvarez MD (Resident)  Beth Carver MD (Resident)  Michael Mcdonald MD (Resident)  BRAYAN Hargrove., Maude Wesley., [facility] office)  Rafa Recinos, [address] (Clinical Practice Manager)  Genoveva Palma, [facility] (Clinical Pharmacist)       Office phone number: [phone]    If you need to get in right away due to illness, please be advised we have \"Same Day\" appointments available Monday-Friday. Please call us at [phone] option #3 to schedule your \"Same Day\" appointment.

## 2022-04-19 NOTE — PROGRESS NOTES
Visit Information    Have you changed or started any medications since your last visit including any over-the-counter medicines, vitamins, or herbal medicines? no   Are you having any side effects from any of your medications? -  no  Have you stopped taking any of your medications? Is so, why? -  no    Have you seen any other physician or provider since your last visit? yes  Have you had any other diagnostic tests since your last visit? No  Have you been seen in the emergency room and/or had an admission to a hospital since we last saw you? No  Have you had your routine dental cleaning in the past 6 months? no    Have you activated your [facility] account? If not, what are your barriers?  Yes     Patient Care Team:  Kathia Car DO as PCP - General (Family Medicine)  Kathia Car DO as PCP - [facility] Provider    Medical History Review  Past Medical, Family, and Social History reviewed and does not contribute to the patient presenting condition    Health Maintenance   Topic Date Due    COVID-19 Vaccine (1) Never done    Depression Monitoring  01/11/2023    Lipid screen  04/05/2024    Cervical cancer screen  09/17/2025    DTaP/Tdap/Td vaccine (2 - Td or Tdap) 02/19/2028    Flu vaccine  Completed    Hepatitis C screen  Completed    HIV screen  Completed    Hepatitis A vaccine  Aged Out    Hepatitis B vaccine  Aged Out    Hib vaccine  Aged Out    Meningococcal (ACWY) vaccine  Aged Out    Pneumococcal 0-64 years Vaccine  Aged Out

## 2022-05-13 ENCOUNTER — HOSPITAL ENCOUNTER (OUTPATIENT)
Dept: WOMENS IMAGING | Age: 42
Discharge: HOME | End: 2022-05-15
Payer: COMMERCIAL

## 2022-05-13 ENCOUNTER — OFFICE VISIT (OUTPATIENT)
Dept: FAMILY MEDICINE CLINIC | Age: 42
End: 2022-05-13
Payer: COMMERCIAL

## 2022-05-13 DIAGNOSIS — F43.23 ADJUSTMENT DISORDER WITH MIXED ANXIETY AND DEPRESSED MOOD: Primary | ICD-10-CM

## 2022-05-13 DIAGNOSIS — N60.02 BREAST CYST, LEFT: ICD-10-CM

## 2022-05-13 DIAGNOSIS — Z12.31 ENCOUNTER FOR SCREENING MAMMOGRAM FOR MALIGNANT NEOPLASM OF BREAST: Primary | ICD-10-CM

## 2022-05-13 PROCEDURE — 76642 ULTRASOUND BREAST LIMITED: CPT

## 2022-05-13 PROCEDURE — 1036F TOBACCO NON-USER: CPT | Performed by: PSYCHOLOGIST

## 2022-05-13 PROCEDURE — 90832 PSYTX W PT 30 MINUTES: CPT | Performed by: PSYCHOLOGIST

## 2022-05-13 NOTE — PROGRESS NOTES
Emeli Anders was seen for follow up session with Aaron Elaine, PhD.     This is patient's fourth appointment. 2022  Time: 2:45 pm-3:22pm    Content of Session:  Progress is slow for her on her goals and it is hard for her to be patient with her slow progress on exercising and writing. She continues to feel \"in a funk\" and states that in some ways it started before her mom . Her grief is ongoing. She got the ACT book but had not read any of it yet. We discussed her reading intro and first couple of chapters. She had time with cousin in [place] and spent some time in [place] since I last saw her. She was able to enjoy herself but still feels that she \"struggles mentally. \"  Discussed difference between pleasurable and meaningful activities as they relate to happiness. Assessment:  Patient alert and oriented and able to engage in meaningful conversation. Eye contact, activity level, and speech were all within normal limits. Thought processes and memory were intact. Mood was pleasant, affect was appropriate to content. Patient complains of the following symptoms: being in a funk, not sticking to her routine, not being motivated. Progress is  fair. Prognosis is good as patient has many strengths including being very in tune with herself, insightful, and determined to meet her goals. PHQ Scores 2016   PHQ2 Score 4 6 0 0 0 2   PHQ9 Score 15 17 0 0 0 2     Interpretation of Total Score Depression Severity: 1-4 = Minimal depression, 5-9 = Mild depression, 10-14 = Moderate depression, 15-19 = Moderately severe depression, 20-27 = Severe depression    How often pt has had thoughts of death or hurting self (if PHQ positive for depression): none 2022        Diagnosis:   Diagnosis Orders   1.  Adjustment disorder with mixed anxiety and depressed mood          INTERVENTION    Therapy approach uses tools from insight oriented therapy, cognitive behavior therapy, and supportive therapy with a focus on relationships to self and others. Worked on /reinforced balanced thinking, Identified and reflected feelings. , Processed feelings related to slow progress on goals and Problem-solving re: working on goals    Treatment Plan:  Patient to return in about 4 weeks to continue to work on goals and decrease symptoms of anxiety and depression (lack of motivation, feeling that she is in a funk, not sticking to her routine) . Continue to monitor and provide support to patient as she grieves the loss of her mom.

## 2022-06-03 RX ORDER — LANOLIN ALCOHOL/MO/W.PET/CERES
CREAM (GRAM) TOPICAL
Qty: 454 G | Refills: 0 | OUTPATIENT
Start: 2022-06-03

## 2022-06-03 NOTE — TELEPHONE ENCOUNTER
E-scribe request for med refills. Please review and e-scribe if applicable.      Last Visit Date:  4/19/22  Next Visit Date:  6/13/2022    Hemoglobin A1C (%)   Date Value   04/19/2022 5.3             ( goal A1C is < 7)   No results found for: LABMICR  LDL Cholesterol (mg/dL)   Date Value   04/05/2019 60       (goal LDL is <100)   AST (U/L)   Date Value   04/05/2019 21     ALT (U/L)   Date Value   04/05/2019 20     BUN (mg/dL)   Date Value   01/11/2022 8     BP Readings from Last 3 Encounters:   04/19/22 139/88   01/11/22 (!) 144/95   10/07/21 122/86          (goal 120/80)        Patient Active Problem List:     Gallstones     Anxiety     Stress     Psychophysiological insomnia     Adjustment disorder with mixed anxiety and depressed mood     Seborrheic dermatitis     Dermatitis      ----Nini Kapadia

## 2022-06-06 NOTE — PROGRESS NOTES
Patient instructed to remove shoes and socks and instructed to sit in exam chair. Current PCP is Monse Garcia,  and date of last visit was 05/13/2022. Do you have a follow up visit scheduled?   No  If yes, the date is

## 2022-06-08 ENCOUNTER — HOSPITAL ENCOUNTER (OUTPATIENT)
Dept: GENERAL RADIOLOGY | Age: 42
Discharge: HOME | End: 2022-06-10
Payer: COMMERCIAL

## 2022-06-08 ENCOUNTER — HOSPITAL ENCOUNTER (OUTPATIENT)
Age: 42
Discharge: HOME | End: 2022-06-10
Payer: COMMERCIAL

## 2022-06-08 ENCOUNTER — OFFICE VISIT (OUTPATIENT)
Dept: PODIATRY | Age: 42
End: 2022-06-08
Payer: COMMERCIAL

## 2022-06-08 VITALS
HEART RATE: 98 BPM | BODY MASS INDEX: 34.22 KG/M2 | HEIGHT: 70 IN | DIASTOLIC BLOOD PRESSURE: 85 MMHG | SYSTOLIC BLOOD PRESSURE: 135 MMHG | WEIGHT: 239 LBS

## 2022-06-08 DIAGNOSIS — M79.672 LEFT FOOT PAIN: ICD-10-CM

## 2022-06-08 DIAGNOSIS — M20.10 VALGUS DEFORMITY OF GREAT TOE, UNSPECIFIED LATERALITY: ICD-10-CM

## 2022-06-08 DIAGNOSIS — M20.11 VALGUS DEFORMITY OF BOTH GREAT TOES: ICD-10-CM

## 2022-06-08 DIAGNOSIS — M20.12 VALGUS DEFORMITY OF BOTH GREAT TOES: ICD-10-CM

## 2022-06-08 DIAGNOSIS — M79.671 RIGHT FOOT PAIN: ICD-10-CM

## 2022-06-08 DIAGNOSIS — M79.671 RIGHT FOOT PAIN: Primary | ICD-10-CM

## 2022-06-08 PROCEDURE — 73630 X-RAY EXAM OF FOOT: CPT

## 2022-06-08 PROCEDURE — 99213 OFFICE O/P EST LOW 20 MIN: CPT | Performed by: PODIATRIST

## 2022-06-08 RX ORDER — LISDEXAMFETAMINE DIMESYLATE 50 MG
CAPSULE ORAL
COMMUNITY
Start: 2022-06-06

## 2022-06-08 NOTE — PROGRESS NOTES
[address]  [address] [facility] [address] [facility], Susana Ayala  Tel: [phone]   Fax: [phone]    Subjective     CC: right foot pain    HPI:  Naida Malin is a 42y.o. year old female who presents to clinic today complaining of pain when her right first MPJ medially is rubbing against shoes for significant amount of time. She does not note significant pain with range of motion or on a consistent basis but she has concerns for the amount of visible changes she is seen over the past decade. She admits to wearing unsupportive sandals and shower shoes inside her home at all times. She has not attempted any medication of pain. 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 Primary care physician is Gricelda Garcia DO.    ROS:    Constitutional: Denies nausea, vomiting, fever, chills. Neurologic: Denies numbness, tingling, and burning in the feet. Vascular: Denies symptoms of lower extremity claudication. Skin: Denies open wounds. Otherwise negative except as noted in the HPI. PMH:  Past Medical History:   Diagnosis Date    Abnormal Pap smear of cervix     Anxiety     Breast cyst, left 10/27/2021    Depression     GERD (gastroesophageal reflux disease)     Headache        Surgical History:   Past Surgical History:   Procedure Laterality Date     SECTION, LOW TRANSVERSE      COLPOSCOPY      GALLBLADDER SURGERY         Social History:  Social History     Tobacco Use    Smoking status: Never Smoker    Smokeless tobacco: Never Used   Vaping Use    Vaping Use: Never used   Substance Use Topics    Alcohol use: Yes    Drug use: No       Medications:  Prior to Admission medications    Medication Sig Start Date End Date Taking?  Authorizing Provider   VYVANSE 50 MG capsule  22  Yes Historical Provider, MD   amLODIPine (NORVASC) 5 MG tablet Take 1 tablet by mouth daily 22  Yes Keyonna Tucker MD   Vitamin D (CHOLECALCIFEROL) 25 MCG (1000 UT) TABS tablet Take 1 tablet by mouth daily 4/19/22  Yes Montse Colbert MD   etonogestrel-ethinyl estradiol (NUVARING) 0.12-0.015 MG/24HR vaginal ring use as directed insert 1 ring vaginally for 3 weeks. Remove and replace. Pt uses continuously. Please dispense accordingly 2/22/22  Yes Lito Phi, APRN - CNP   Skin Protectants, Misc. (Wyvonna Nilda) CREA Apply to affected area 4 times a day as needed 2/21/22  Yes Jermaine Martin, DO   Ciclopirox 1 % SHAM Apply 1 each topically as needed (use as needed) 1/11/22  Yes Montse Colbert MD   fluocinolone (DERMA-SMOOTHE) 0.01 % external oil Use as needed 1/11/22  Yes Montse Colbert MD   mometasone (ELOCON) 0.1 % lotion Apply topically daily. 1/11/22  Yes Montse Colbert MD   sertaconazole nitrate (ERTACZO) 2 % CREA cream Apply Twice daily for up to four weeks. Do not apply to face, head or neck 2/17/21  Yes Myesha Little MD   Skin Protectants, Misc. (EUCERIN) cream Apply topically as needed. 2/17/21  Yes Myesha Little MD   Elastic Bandages & Supports (CARPAL TUNNEL WRIST STABILIZER) [address] [facility] 1 applicator by Does not apply route nightly RIGHT WRIST 1/7/21  Yes Viridiana Sotomayor MD       Objective     Vitals:    06/08/22 1425   BP: 135/85   Pulse: 98       Lab Results   Component Value Date    LABA1C 5.3 04/19/2022       No results found for: SEDRATE        Physical Exam:  General:  Alert and oriented x3. In no acute distress. Lower Extremity Physical Exam:    Vascular: DP and PT pulses are palpable. CFT <3 seconds to all digits. No pedal edema. Hair growth is absent to the level of all pedal digits. Neuro: Saph/sural/SP/DP/plantar sensation tender to 10 to light touch. Protective sensation is intact to 10/10 sites as tested with a 5.07g SWMF to both feet. Musculoskeletal: EHL/FHL/GS/TA gross motor intact to the right and left LE. Palpation of right first MPJ does not elicit significant pain with range of motion.   Jeferson Menjivar deformity is present bilateral bunion deformity with hypertrophic medial growth. Dermatologic: No open lesions, Bilateral.  Interdigital maceration absent, Bilateral.  Nails 1-10 are within normal limits. Biomechanical Exam:    R Hallux Dflex: 30 Pflex: >15  R 1st Ray D/Pflex: 5mm      R Ankle DF knee extended: 6  Right foot: Ankle DF knee flexed: >7  L Hallux Dflex: 30 Pflex: >15  L 1st Ray D/Pflex: 5mm  L Ankle DF knee extended: 6  L Ankle DF knee flexed: >8  Gait Analysis: early toe off  Tx plan: equinus stretching, orthotics, xr, shoegear changes    Imaging: Ordered WB b/l    No orders to display       Assessment   Jeet Owens is a 42 y.o. female with     Diagnosis Orders   1. Right foot pain  XR FOOT RIGHT (MIN 3 VIEWS)   2. Left foot pain  XR FOOT LEFT (MIN 3 VIEWS)   3. Valgus deformity of both great toes     4. Valgus deformity of great toe, unspecified laterality          Plan   · Patient examined and evaluated  · Diagnosis and treatment options discussed in detail  · XR b/l WB ordered for baseline and eval   · Educated pt shoe gear changes, equinus and daily stretching, bunion deformities and etiology  · Patient to RTC as needed   · Please, call the office with any questions or concerns   · Discussed with Dr. Isaias Park    No orders of the defined types were placed in this encounter.     Orders Placed This Encounter   Procedures    XR FOOT RIGHT (MIN 3 VIEWS)     Weight bearing     Standing Status:   Future     Number of Occurrences:   1     Standing Expiration Date:   6/8/2023     Scheduling Instructions:      Weight bearing     Order Specific Question:   Reason for exam:     Answer:   pain    XR FOOT LEFT (MIN 3 VIEWS)     Standing Status:   Future     Number of Occurrences:   1     Standing Expiration Date:   6/8/2023     Scheduling Instructions:      Weight bearing please     Order Specific Question:   Reason for exam:     Answer:   Pain       Noah Vidal DPM   Podiatric Medicine & Surgery   6/8/2022 at 3:21 PM    This note is created with the assistance of a speech recognition program.  While intending to generate a document that actually reflects the content of the visit, the document can still have some errors including those of syntax and sound a like substitutions which may escape proof reading. In such instances, actual meaning can be extrapolated by contextual diversion.

## 2022-06-09 ENCOUNTER — TELEPHONE (OUTPATIENT)
Dept: FAMILY MEDICINE CLINIC | Age: 42
End: 2022-06-09

## 2022-06-09 NOTE — TELEPHONE ENCOUNTER
----- Message from Garry Gilliam sent at 6/9/2022 11:15 AM EDT -----  Subject: Message to Provider    QUESTIONS  Information for Provider? Patient had seen Dr. Manny Zapata in the past and is   interested in scheduling a f/u appt with her. No appts were offered with   this provider. Can you please call the patient to schedule?  ---------------------------------------------------------------------------  --------------  CALL BACK INFO  What is the best way for the office to contact you? OK to leave message on   voicemail  Preferred Call Back Phone Number? [phone]  ---------------------------------------------------------------------------  --------------  SCRIPT ANSWERS  Relationship to Patient?  Self

## 2022-06-13 ENCOUNTER — OFFICE VISIT (OUTPATIENT)
Dept: FAMILY MEDICINE CLINIC | Age: 42
End: 2022-06-13
Payer: COMMERCIAL

## 2022-06-13 DIAGNOSIS — F43.23 ADJUSTMENT DISORDER WITH MIXED ANXIETY AND DEPRESSED MOOD: Primary | ICD-10-CM

## 2022-06-13 PROCEDURE — 1036F TOBACCO NON-USER: CPT | Performed by: PSYCHOLOGIST

## 2022-06-13 PROCEDURE — 90832 PSYTX W PT 30 MINUTES: CPT | Performed by: PSYCHOLOGIST

## 2022-06-13 NOTE — PROGRESS NOTES
Britany Tucker was seen for follow up session with Leonor Bundy, PhD.     This is patient's fifth appointment. 6/13/2022  Time:  10:59 am-11:35    Content of Session: Patient arrived lat to appointment. Patient reports following though better on some of her goals and having more energy from drinking more water. Patient had not done homework assignment but has been doing more things in general including: printing out part of  her book, looking for jobs, working on her  course online, and dealing with issues re selling her mom's house. She has also been walking outside more. Patient having rift in relationship with youngest sister about selling mom's house. Discussed accomplishing goals by working slowly but surely. Discussed ways of repairing relationship with sister including backing off or re-engaging. Assessment:  Patient alert and oriented and able to engage in meaningful conversation. Eye contact, activity level, and speech were all within normal limits. Thought processes and memory were intact. Mood was pleasant, affect was appropriate to content. Patient complains of the following symptoms:  Problems with consistency and follow through of behaviors to apply what she knows. Progress is  Good in that she has improved energy and motivation and has completed several tasks. Prognosis is  Good as patient has excellent insight and judgment. PHQ Scores 4/19/2022 1/11/2022 1/7/2021 2/26/2019 2/19/2018 12/28/2016   PHQ2 Score 4 6 0 0 0 2   PHQ9 Score 15 17 0 0 0 2     Interpretation of Total Score Depression Severity: 1-4 = Minimal depression, 5-9 = Mild depression, 10-14 = Moderate depression, 15-19 = Moderately severe depression, 20-27 = Severe depression    How often pt has had thoughts of death or hurting self (if PHQ positive for depression):       No flowsheet data found.   Interpretation of RANDALL-7 score: 5-9 = mild anxiety, 10-14 = moderate anxiety, 15+ = severe anxiety. Recommend referral to behavioral health for scores 10 or greater. Diagnosis:   Diagnosis Orders   1. Adjustment disorder with mixed anxiety and depressed mood          INTERVENTION    Therapy approach uses tools from insight oriented therapy, cognitive behavior therapy, and supportive therapy with a focus on relationships to self and others. Supportive techniques/ validated feelings/ provided empathy and compassion/developed rapport, Worked on /reinforced balanced thinking, Identified and reflected feelings. and Processed feelings related to progress as well as rfit with sister. Treatment Plan:  Continue to facilitate effective coping with stressors, supporting patient during time of grief, and addressing any barriers to completion of goals.

## 2022-06-16 ENCOUNTER — OFFICE VISIT (OUTPATIENT)
Dept: FAMILY MEDICINE CLINIC | Age: 42
End: 2022-06-16
Payer: COMMERCIAL

## 2022-06-16 VITALS — DIASTOLIC BLOOD PRESSURE: 89 MMHG | SYSTOLIC BLOOD PRESSURE: 138 MMHG | HEART RATE: 85 BPM | TEMPERATURE: 98.1 F

## 2022-06-16 DIAGNOSIS — K80.20 GALLSTONES: ICD-10-CM

## 2022-06-16 DIAGNOSIS — E66.01 CLASS 2 SEVERE OBESITY DUE TO EXCESS CALORIES WITH SERIOUS COMORBIDITY IN ADULT, UNSPECIFIED BMI (HCC): Primary | ICD-10-CM

## 2022-06-16 DIAGNOSIS — I10 PRIMARY HYPERTENSION: ICD-10-CM

## 2022-06-16 PROCEDURE — 1036F TOBACCO NON-USER: CPT | Performed by: STUDENT IN AN ORGANIZED HEALTH CARE EDUCATION/TRAINING PROGRAM

## 2022-06-16 PROCEDURE — 99213 OFFICE O/P EST LOW 20 MIN: CPT | Performed by: STUDENT IN AN ORGANIZED HEALTH CARE EDUCATION/TRAINING PROGRAM

## 2022-06-16 PROCEDURE — G8427 DOCREV CUR MEDS BY ELIG CLIN: HCPCS | Performed by: STUDENT IN AN ORGANIZED HEALTH CARE EDUCATION/TRAINING PROGRAM

## 2022-06-16 PROCEDURE — G8417 CALC BMI ABV UP PARAM F/U: HCPCS | Performed by: STUDENT IN AN ORGANIZED HEALTH CARE EDUCATION/TRAINING PROGRAM

## 2022-06-16 RX ORDER — SEMAGLUTIDE 0.25 MG/.5ML
0.25 INJECTION, SOLUTION SUBCUTANEOUS
Qty: 2 ML | Refills: 3 | Status: SHIPPED | OUTPATIENT
Start: 2022-06-16

## 2022-06-16 ASSESSMENT — ENCOUNTER SYMPTOMS
SORE THROAT: 0
ABDOMINAL DISTENTION: 0
DIARRHEA: 0
SINUS PRESSURE: 0
COUGH: 0
SHORTNESS OF BREATH: 0
ABDOMINAL PAIN: 0
WHEEZING: 0
COLOR CHANGE: 0
ANAL BLEEDING: 0
CHEST TIGHTNESS: 0
NAUSEA: 0
SINUS PAIN: 0

## 2022-06-16 NOTE — PROGRESS NOTES
Visit Information    Have you changed or started any medications since your last visit including any over-the-counter medicines, vitamins, or herbal medicines? no   Have you stopped taking any of your medications? Is so, why? -  no  Are you having any side effects from any of your medications? - no    Have you seen any other physician or provider since your last visit?  no   Have you had any other diagnostic tests since your last visit?  no   Have you been seen in the emergency room and/or had an admission in a hospital since we last saw you?  no   Have you had your routine dental cleaning in the past 6 months?  no     Do you have an active MyChart account? If no, what is the barrier?   No:     Patient Care Team:  Renate Lane DO as PCP - General (Family Medicine)  Renate Lane DO as PCP - [facility]    Medical History Review  Past Medical, Family, and Social History reviewed and does not contribute to the patient presenting condition    Health Maintenance   Topic Date Due    COVID-19 Vaccine (1) Never done    Depression Monitoring  04/19/2023    Lipids  04/05/2024    Cervical cancer screen  09/17/2025    DTaP/Tdap/Td vaccine (2 - Td or Tdap) 02/19/2028    Flu vaccine  Completed    Hepatitis C screen  Completed    HIV screen  Completed    Hepatitis A vaccine  Aged Out    Hepatitis B vaccine  Aged Out    Hib vaccine  Aged Out    Meningococcal (ACWY) vaccine  Aged Out    Pneumococcal 0-64 years Vaccine  Aged Out

## 2022-06-16 NOTE — PATIENT INSTRUCTIONS
Thank you for letting us take care of you today. We hope all your questions were addressed. If a question was overlooked or something else comes to mind after you return home, please contact a member of your Care Team listed below. Your Care Team at [place] [address] is Team #4  Iris Edward MD (Faculty)  Jo Giraldo MD (Resident)  Shanthi San MD (Resident)  Angelina Benoit MD (Resident)  Juanito Rod MD (Resident)  FLORIDA Chapa,BRAYAN Duarte, [facility] office)  Dahiana Wright [place] (Clinical Practice Manager)  Anabell Thompson [facility] (Clinical Pharmacist)       Office phone number: [phone]    If you need to get in right away due to illness, please be advised we have \"Same Day\" appointments available Monday-Friday. Please call us at [phone] option #3 to schedule your \"Same Day\" appointment.

## 2022-06-16 NOTE — PROGRESS NOTES
Subjective:    Marko Rendon is a 42 y.o. female with  has a past medical history of Abnormal Pap smear of cervix, Anxiety, Breast cyst, left, Depression, GERD (gastroesophageal reflux disease), and Headache. Presented to the office today for:  Chief Complaint   Patient presents with    Weight Loss     patient is here for wieght loss. patient refused getting on scale       HPI    Patient is a 76-year-old female with past medical history of obesity, gallstones, anxiety. She presented today for follow-up. Patient was previously prescribed B-CoVita for weight loss however patient never received the medication from the pharmacy due to insurance problems. She is here to request that the medication sent into the pharmacy today. She is also wanting a referral to bariatric surgery. Review of Systems   Constitutional: Negative for fatigue and fever. HENT: Negative for sinus pressure, sinus pain, sore throat and tinnitus. Eyes: Negative for visual disturbance. Respiratory: Negative for cough, chest tightness, shortness of breath and wheezing. Cardiovascular: Negative for chest pain, palpitations and leg swelling. Gastrointestinal: Negative for abdominal distention, abdominal pain, anal bleeding, diarrhea and nausea. Genitourinary: Negative for enuresis, frequency and urgency. Musculoskeletal: Negative for arthralgias, gait problem and neck stiffness. Skin: Negative for color change and rash. Neurological: Negative for dizziness and headaches. Psychiatric/Behavioral: Negative for agitation and confusion.                  The patient has a   Family History   Problem Relation Age of Onset    No Known Problems Mother     Asthma Father     Diabetes Maternal Grandmother         IDDM    Heart Attack Maternal Grandmother     Stroke Maternal Grandmother     Heart Attack Maternal Grandfather     No Known Problems Paternal Grandmother     No Known Problems Paternal Grandfather Objective:    /89 (Site: Left Upper Arm, Position: Sitting, Cuff Size: Large Adult)   Pulse 85   Temp 98.1 °F (36.7 °C) (Temporal)    BP Readings from Last 3 Encounters:   06/16/22 138/89   06/08/22 135/85   04/19/22 139/88       Physical Exam  Constitutional:       Appearance: Normal appearance. Cardiovascular:      Rate and Rhythm: Normal rate and regular rhythm. Heart sounds: No murmur heard. No friction rub. No gallop. Pulmonary:      Effort: Pulmonary effort is normal.      Breath sounds: No wheezing, rhonchi or rales. Neurological:      Mental Status: She is alert and oriented to person, place, and time. Lab Results   Component Value Date    WBC 5.0 01/11/2022    HGB 13.2 01/11/2022    HCT 42.9 01/11/2022     01/11/2022    CHOL 132 04/05/2019    TRIG 71 04/05/2019    HDL 58 04/05/2019    ALT 20 04/05/2019    AST 21 04/05/2019     01/11/2022    K 3.9 01/11/2022     01/11/2022    CREATININE 0.61 01/11/2022    BUN 8 01/11/2022    CO2 18 (L) 01/11/2022    TSH 1.83 01/11/2022    LABA1C 5.3 04/19/2022     Lab Results   Component Value Date    CALCIUM 9.0 01/11/2022     Lab Results   Component Value Date    LDLCHOLESTEROL 60 04/05/2019       Assessment and Plan:    1. Class 2 severe obesity due to excess calories with serious comorbidity in adult, unspecified BMI (HCC)  - Semaglutide-Weight Management (WEGOVY) 0.25 MG/0.5ML SOAJ SC injection; Inject 0.25 mg into the skin every 7 days  Dispense: 2 mL; Refill: 3  - [facility] Marlo Showers, DO, Weight Management and [facility]    2. Primary hypertension  - controlled              Requested Prescriptions     Signed Prescriptions Disp Refills    Semaglutide-Weight Management (WEGOVY) 0.25 MG/0.5ML SOAJ SC injection 2 mL 3     Sig: Inject 0.25 mg into the skin every 7 days       There are no discontinued medications.     Lopez received counseling on the following healthy behaviors: nutrition, exercise and medication adherence    Discussed use,benefit, and side effects of prescribed medications. Barriers to medication compliance addressed. All patient questions answered. Pt voiced understanding. Return in about 3 months (around 9/16/2022) for obesity. Disclaimer: Some orall of this note was transcribed using voice-recognition software. This may cause typographical errors occasionally. Although all effort is made to fix these errors, please do not hesitate to contact our office if there Mike Goodness concern with the understanding of this note.

## 2022-06-16 NOTE — PROGRESS NOTES
I have reviewed and discussed key elements of [address] [place] [address] with the resident including plan of care and follow up and agree with the care kourtney plan. Diagnosis Orders   1. Class 2 severe obesity due to excess calories with serious comorbidity in adult, unspecified BMI (HCC)  Semaglutide-Weight Management (WEGOVY) 0.25 MG/0.5ML SOAJ SC injection    Nannette CAST DO, Weight Management and [facility]   2. Primary hypertension     3.  Gallstones

## 2022-06-29 RX ORDER — LANOLIN ALCOHOL/MO/W.PET/CERES
CREAM (GRAM) TOPICAL
Qty: 454 G | Refills: 0 | Status: SHIPPED | OUTPATIENT
Start: 2022-06-29 | End: 2022-09-08

## 2022-06-29 NOTE — TELEPHONE ENCOUNTER
E-scribe request for med refills. Please review and e-scribe if applicable.      Last Visit Date:  6/16/2022  Next Visit Date:  7/15/2022    Hemoglobin A1C (%)   Date Value   04/19/2022 5.3             ( goal A1C is < 7)   No results found for: LABMICR  LDL Cholesterol (mg/dL)   Date Value   04/05/2019 60       (goal LDL is <100)   AST (U/L)   Date Value   04/05/2019 21     ALT (U/L)   Date Value   04/05/2019 20     BUN (mg/dL)   Date Value   01/11/2022 8     BP Readings from Last 3 Encounters:   06/16/22 138/89   06/08/22 135/85   04/19/22 139/88          (goal 120/80)        Patient Active Problem List:     Gallstones     Anxiety     Stress     Psychophysiological insomnia     Adjustment disorder with mixed anxiety and depressed mood     Seborrheic dermatitis     Dermatitis      ----Yari Gatica

## 2022-06-30 ENCOUNTER — TELEPHONE (OUTPATIENT)
Dept: FAMILY MEDICINE CLINIC | Age: 42
End: 2022-06-30

## 2022-06-30 NOTE — TELEPHONE ENCOUNTER
PA has been submitted for [facility]. Waiting on insurance to response back.

## 2022-07-05 NOTE — TELEPHONE ENCOUNTER
Cover my meds contacted office regarding medication. Writer transferred call to John Monterroso who handled the PA.

## 2022-07-05 NOTE — TELEPHONE ENCOUNTER
Per insurance-Please advise the dispensing pharmacy to contact the [address] at [phone] for assistance. Writer informed the pharmacy.

## 2022-07-11 ENCOUNTER — TELEPHONE (OUTPATIENT)
Dept: FAMILY MEDICINE CLINIC | Age: 42
End: 2022-07-11

## 2022-07-11 NOTE — TELEPHONE ENCOUNTER
Patient called because her Semaglutide was not approved by her insurance and she would like to know if something different could be sent to her pharmacy, please advise.

## 2022-07-12 NOTE — TELEPHONE ENCOUNTER
Patient was seen on 6/16/22. PA was not approved and insurance is requiring it to be changed, please advise.

## 2022-07-27 NOTE — TELEPHONE ENCOUNTER
Patient called very upset because she has not had a response to the request of a medication change from her insurance company. States she has been without medication for 30 days now. PA was completed on 7/11/22 and Semaglutide is not covered, please advise.

## 2022-07-27 NOTE — TELEPHONE ENCOUNTER
There are very few medications that insurance covers for weight loss. Since they wont cover semaglutide I suggest she call her insurance company I can try something similar that is a daily injection called saxenda which I recommended for her to take several months ago. If they dont cover that there is nothing else I am willing to prescribe.

## 2022-08-01 ENCOUNTER — OFFICE VISIT (OUTPATIENT)
Dept: FAMILY MEDICINE CLINIC | Age: 42
End: 2022-08-01
Payer: MEDICARE

## 2022-08-01 ENCOUNTER — TELEPHONE (OUTPATIENT)
Dept: FAMILY MEDICINE CLINIC | Age: 42
End: 2022-08-01

## 2022-08-01 DIAGNOSIS — F43.23 ADJUSTMENT DISORDER WITH MIXED ANXIETY AND DEPRESSED MOOD: Primary | ICD-10-CM

## 2022-08-01 PROCEDURE — 90832 PSYTX W PT 30 MINUTES: CPT | Performed by: PSYCHOLOGIST

## 2022-08-01 PROCEDURE — 1036F TOBACCO NON-USER: CPT | Performed by: PSYCHOLOGIST

## 2022-08-01 NOTE — PROGRESS NOTES
Mikhail Johnson was seen for follow up session with Josue Campos, PhD.     This is patient's sixth appointment. 8/1/2022  Time: 11:05-11:32 am    Content of Session:  Patient arrived late. Patient had read 3 chapters of book and found it helpful. She has continued to make progress and has finished 2 courses related to her career goals. Patient reporting feeling \"lighter. \"  Patient presents with improved mood and motivation. Patient still mourning the loss of her mom and will be going to see extended family later this week. Her daughter will join her. There is unresolved grief with multiple family members but patient is trying to attend to how the family can heal and move forward. Rift with sisters has calmed down some. Patient plans on trying to mend fences with sisters. She reports missing them. Patient reports needing to get more focused on specific career goals to make a stream of income. She has not been regularly going to the gym but reports being active. Patient would like to continue to meet monthly. Assessment:  Patient alert and oriented and able to engage in meaningful conversation. Eye contact, activity level, and speech were all within normal limits. Thought processes and memory were intact. Mood was pleasant, affect was appropriate to content. Patient complains of the following symptoms: needing to decide what to focus on with regard to career, grief reactions in multiple family members, but overall better mood and motivation. Progress is  good. Prognosis is  good.           PHQ Scores 4/19/2022 1/11/2022 1/7/2021 2/26/2019 2/19/2018 12/28/2016   PHQ2 Score 4 6 0 0 0 2   PHQ9 Score 15 17 0 0 0 2     Interpretation of Total Score Depression Severity: 1-4 = Minimal depression, 5-9 = Mild depression, 10-14 = Moderate depression, 15-19 = Moderately severe depression, 20-27 = Severe depression    How often pt has had thoughts of death or hurting self (if PHQ positive for depression):       No flowsheet data found. Interpretation of RANDALL-7 score: 5-9 = mild anxiety, 10-14 = moderate anxiety, 15+ = severe anxiety. Recommend referral to behavioral health for scores 10 or greater. Diagnosis:   Diagnosis Orders   1. Adjustment disorder with mixed anxiety and depressed mood             INTERVENTION    Therapy approach uses tools from insight oriented therapy, cognitive behavior therapy, and supportive therapy with a focus on relationships to self and others. Reinforced self-care , Worked on /reinforced balanced thinking, and Processed feelings related to family relationships, grief reactions, career goals. Treatment Plan:  Continue to facilitate effective coping with stressors, supporting patient during time of grief, and addressing any barriers to completion of goals. Continue monthly meetings. Redo PHQ9 next time.

## 2022-09-08 RX ORDER — LANOLIN ALCOHOL/MO/W.PET/CERES
CREAM (GRAM) TOPICAL
Qty: 454 G | Refills: 0 | Status: SHIPPED | OUTPATIENT
Start: 2022-09-08

## 2022-09-08 NOTE — TELEPHONE ENCOUNTER
E-scribe request for med refills. Please review and e-scribe if applicable.      Last Visit Date:  6/16/22  Next Visit Date:  Visit date not found    Hemoglobin A1C (%)   Date Value   04/19/2022 5.3             ( goal A1C is < 7)   No results found for: LABMICR  LDL Cholesterol (mg/dL)   Date Value   04/05/2019 60       (goal LDL is <100)   AST (U/L)   Date Value   04/05/2019 21     ALT (U/L)   Date Value   04/05/2019 20     BUN (mg/dL)   Date Value   01/11/2022 8     BP Readings from Last 3 Encounters:   06/16/22 138/89   06/08/22 135/85   04/19/22 139/88          (goal 120/80)        Patient Active Problem List:     Gallstones     Anxiety     Stress     Psychophysiological insomnia     Adjustment disorder with mixed anxiety and depressed mood     Seborrheic dermatitis     Dermatitis      ----Siria Espino

## 2022-10-19 ENCOUNTER — OFFICE VISIT (OUTPATIENT)
Dept: FAMILY MEDICINE CLINIC | Age: 42
End: 2022-10-19
Payer: MEDICARE

## 2022-10-19 VITALS
SYSTOLIC BLOOD PRESSURE: 138 MMHG | HEART RATE: 98 BPM | TEMPERATURE: 97.2 F | DIASTOLIC BLOOD PRESSURE: 98 MMHG | BODY MASS INDEX: 34.29 KG/M2 | HEIGHT: 70 IN

## 2022-10-19 DIAGNOSIS — M54.2 NECK PAIN: ICD-10-CM

## 2022-10-19 DIAGNOSIS — R05.8 OTHER COUGH: ICD-10-CM

## 2022-10-19 DIAGNOSIS — M62.838 CERVICAL PARASPINOUS MUSCLE SPASM: Primary | ICD-10-CM

## 2022-10-19 PROCEDURE — 99211 OFF/OP EST MAY X REQ PHY/QHP: CPT

## 2022-10-19 PROCEDURE — 1036F TOBACCO NON-USER: CPT

## 2022-10-19 PROCEDURE — G8417 CALC BMI ABV UP PARAM F/U: HCPCS

## 2022-10-19 PROCEDURE — G8427 DOCREV CUR MEDS BY ELIG CLIN: HCPCS

## 2022-10-19 PROCEDURE — G8482 FLU IMMUNIZE ORDER/ADMIN: HCPCS

## 2022-10-19 PROCEDURE — 99213 OFFICE O/P EST LOW 20 MIN: CPT

## 2022-10-19 PROCEDURE — G0008 ADMIN INFLUENZA VIRUS VAC: HCPCS | Performed by: FAMILY MEDICINE

## 2022-10-19 RX ORDER — DESVENLAFAXINE 50 MG/1
TABLET, EXTENDED RELEASE ORAL
COMMUNITY
Start: 2022-09-27

## 2022-10-19 RX ORDER — CYCLOBENZAPRINE HCL 10 MG
10 TABLET ORAL NIGHTLY PRN
Qty: 7 TABLET | Refills: 0 | Status: SHIPPED | OUTPATIENT
Start: 2022-10-19 | End: 2022-10-26

## 2022-10-19 RX ORDER — IBUPROFEN 600 MG/1
600 TABLET ORAL 3 TIMES DAILY PRN
Qty: 90 TABLET | Refills: 0 | Status: SHIPPED | OUTPATIENT
Start: 2022-10-19

## 2022-10-19 SDOH — ECONOMIC STABILITY: FOOD INSECURITY: WITHIN THE PAST 12 MONTHS, YOU WORRIED THAT YOUR FOOD WOULD RUN OUT BEFORE YOU GOT MONEY TO BUY MORE.: NEVER TRUE

## 2022-10-19 SDOH — ECONOMIC STABILITY: FOOD INSECURITY: WITHIN THE PAST 12 MONTHS, THE FOOD YOU BOUGHT JUST DIDN'T LAST AND YOU DIDN'T HAVE MONEY TO GET MORE.: NEVER TRUE

## 2022-10-19 ASSESSMENT — PATIENT HEALTH QUESTIONNAIRE - PHQ9
SUM OF ALL RESPONSES TO PHQ QUESTIONS 1-9: 10
SUM OF ALL RESPONSES TO PHQ9 QUESTIONS 1 & 2: 0
4. FEELING TIRED OR HAVING LITTLE ENERGY: 3
2. FEELING DOWN, DEPRESSED OR HOPELESS: 0
1. LITTLE INTEREST OR PLEASURE IN DOING THINGS: 0
SUM OF ALL RESPONSES TO PHQ QUESTIONS 1-9: 10
6. FEELING BAD ABOUT YOURSELF - OR THAT YOU ARE A FAILURE OR HAVE LET YOURSELF OR YOUR FAMILY DOWN: 0
5. POOR APPETITE OR OVEREATING: 1
10. IF YOU CHECKED OFF ANY PROBLEMS, HOW DIFFICULT HAVE THESE PROBLEMS MADE IT FOR YOU TO DO YOUR WORK, TAKE CARE OF THINGS AT HOME, OR GET ALONG WITH OTHER PEOPLE: 3
3. TROUBLE FALLING OR STAYING ASLEEP: 3
8. MOVING OR SPEAKING SO SLOWLY THAT OTHER PEOPLE COULD HAVE NOTICED. OR THE OPPOSITE, BEING SO FIGETY OR RESTLESS THAT YOU HAVE BEEN MOVING AROUND A LOT MORE THAN USUAL: 0
9. THOUGHTS THAT YOU WOULD BE BETTER OFF DEAD, OR OF HURTING YOURSELF: 0
7. TROUBLE CONCENTRATING ON THINGS, SUCH AS READING THE NEWSPAPER OR WATCHING TELEVISION: 3

## 2022-10-19 ASSESSMENT — ENCOUNTER SYMPTOMS
ABDOMINAL PAIN: 0
BACK PAIN: 0
CHEST TIGHTNESS: 0
WHEEZING: 0
SHORTNESS OF BREATH: 0

## 2022-10-19 ASSESSMENT — SOCIAL DETERMINANTS OF HEALTH (SDOH): HOW HARD IS IT FOR YOU TO PAY FOR THE VERY BASICS LIKE FOOD, HOUSING, MEDICAL CARE, AND HEATING?: NOT HARD AT ALL

## 2022-10-19 NOTE — PROGRESS NOTES
Visit Information    Have you changed or started any medications since your last visit including any over-the-counter medicines, vitamins, or herbal medicines? no   Have you stopped taking any of your medications? Is so, why? -  no  Are you having any side effects from any of your medications? - no    Have you seen any other physician or provider since your last visit?  no   Have you had any other diagnostic tests since your last visit?  no   Have you been seen in the emergency room and/or had an admission in a hospital since we last saw you?  no   Have you had your routine dental cleaning in the past 6 months?  no     Do you have an active MyChart account? If no, what is the barrier?   Yes    Patient Care Team:  Isrrael Rey DO as PCP - General (Family Medicine)  [facility] Rey [facility]    Medical History Review  Past Medical, Family, and Social History reviewed and does not contribute to the patient presenting condition    Health Maintenance   Topic Date Due    COVID-19 Vaccine (1) Never done    Flu vaccine (1) 08/01/2022    Depression Monitoring  04/19/2023    Lipids  04/05/2024    Cervical cancer screen  09/17/2025    DTaP/Tdap/Td vaccine (3 - Td or Tdap) 02/19/2028    Hepatitis C screen  Completed    HIV screen  Completed    Hepatitis A vaccine  Aged Out    Hib vaccine  Aged Out    Meningococcal (ACWY) vaccine  Aged Out    Pneumococcal 0-64 years Vaccine  Aged Out

## 2022-10-19 NOTE — PROGRESS NOTES
[facility]    Family Medicine Residency Program - Outpatient Note      Subjective:    Brennon Sharma is a 36 y.o. female with  has a past medical history of Abnormal Pap smear of cervix, Anxiety, Breast cyst, left, Depression, GERD (gastroesophageal reflux disease), and Headache. Presented to the office today for:  Chief Complaint   Patient presents with    Back Pain    Neck Pain    Shoulder Pain       HPI  Patient is a 37 yo female came in the office with neck, shoulder pain     Neck pain and Back and shoulder pain, rates it 5/10  Started about 3 weeks ago    Patient reports that she has seasonal allergies. Associated productive cough. Reported sleeping on the affected side and developed spasm of the neck muscles    Denies chest pain, SOB    Patient has elevated BP today 147/96  Denies headache, nausea, vomting      Review of Systems   Constitutional:  Negative for activity change and appetite change. Respiratory:  Negative for chest tightness, shortness of breath and wheezing. Cardiovascular:  Negative for chest pain and palpitations. Gastrointestinal:  Negative for abdominal pain. Genitourinary:  Negative for enuresis and frequency. Musculoskeletal:  Positive for neck pain and neck stiffness. Negative for arthralgias and back pain. Neurological:  Negative for seizures and numbness. Psychiatric/Behavioral:  Negative for agitation and behavioral problems.                 The patient has a   Family History   Problem Relation Age of Onset    No Known Problems Mother     Asthma Father     Diabetes Maternal Grandmother         IDDM    Heart Attack Maternal Grandmother     Stroke Maternal Grandmother     Heart Attack Maternal Grandfather     No Known Problems Paternal Grandmother     No Known Problems Paternal Grandfather        Objective:    BP (!) 147/96 (Site: Left Upper Arm, Position: Sitting, Cuff Size: Large Adult)   Pulse 98   Temp 97.2 °F (36.2 °C) (Oral)   Ht 5' 10\" (1.778 m)   BMI 34.29 kg/m²    BP Readings from Last 3 Encounters:   10/19/22 (!) 147/96   06/16/22 138/89   06/08/22 135/85       Physical Exam  Cardiovascular:      Rate and Rhythm: Normal rate and regular rhythm. Pulses: Normal pulses. Heart sounds: Normal heart sounds. Skin:     General: Skin is warm. Neurological:      General: No focal deficit present. Mental Status: She is alert and oriented to person, place, and time. Comments: Pain on flexion and extension of neck   Pain more on the R>L neck   Psychiatric:         Mood and Affect: Mood normal.         Behavior: Behavior normal.       Lab Results   Component Value Date    WBC 5.0 01/11/2022    HGB 13.2 01/11/2022    HCT 42.9 01/11/2022     01/11/2022    CHOL 132 04/05/2019    TRIG 71 04/05/2019    HDL 58 04/05/2019    ALT 20 04/05/2019    AST 21 04/05/2019     01/11/2022    K 3.9 01/11/2022     01/11/2022    CREATININE 0.61 01/11/2022    BUN 8 01/11/2022    CO2 18 (L) 01/11/2022    TSH 1.83 01/11/2022    LABA1C 5.3 04/19/2022     Lab Results   Component Value Date    CALCIUM 9.0 01/11/2022     Lab Results   Component Value Date    LDLCHOLESTEROL 60 04/05/2019       Assessment and Plan:    1. Cervical paraspinous muscle spasm  -Likely has cervical muscle spasm  -dispensed home exercise that patient can do to relieve symptoms  -apply Warm, heating pad over the affected area  - cyclobenzaprine (FLEXERIL) 10 MG tablet; Take 1 tablet by mouth nightly as needed for Muscle spasms  Dispense: 7 tablet; Refill: 0  - ibuprofen (ADVIL;MOTRIN) 600 MG tablet; Take 1 tablet by mouth 3 times daily as needed for Pain  Dispense: 90 tablet; Refill: 0    2. Other cough    - XR CHEST STANDARD (2 VW); Future    3. Neck pain    - cyclobenzaprine (FLEXERIL) 10 MG tablet; Take 1 tablet by mouth nightly as needed for Muscle spasms  Dispense: 7 tablet; Refill: 0  - ibuprofen (ADVIL;MOTRIN) 600 MG tablet;  Take 1 tablet by mouth 3 times daily as needed for Pain  Dispense: 90 tablet; Refill: 0        Requested Prescriptions     Signed Prescriptions Disp Refills    cyclobenzaprine (FLEXERIL) 10 MG tablet 7 tablet 0     Sig: Take 1 tablet by mouth nightly as needed for Muscle spasms    ibuprofen (ADVIL;MOTRIN) 600 MG tablet 90 tablet 0     Sig: Take 1 tablet by mouth 3 times daily as needed for Pain       There are no discontinued medications. Lopez received counseling on the following healthy behaviors: nutrition, exercise and medication adherence    Discussed use,benefit, and side effects of prescribed medications. Barriers to medication compliance addressed. All patient questions answered. Pt voiced understanding. Return if symptoms worsen or fail to improve. Disclaimer: Some orall of this note was transcribed using voice-recognition software. This may cause typographical errors occasionally. Although all effort is made to fix these errors, please do not hesitate to contact our office if there Verlie Orf concern with the understanding of this note.

## 2022-10-21 ENCOUNTER — HOSPITAL ENCOUNTER (OUTPATIENT)
Dept: GENERAL RADIOLOGY | Age: 42
Discharge: HOME | End: 2022-10-23
Payer: MEDICARE

## 2022-10-21 ENCOUNTER — HOSPITAL ENCOUNTER (OUTPATIENT)
Age: 42
Discharge: HOME | End: 2022-10-23
Payer: MEDICARE

## 2022-10-21 DIAGNOSIS — R05.8 OTHER COUGH: ICD-10-CM

## 2022-10-21 PROCEDURE — 71046 X-RAY EXAM CHEST 2 VIEWS: CPT

## 2022-11-04 NOTE — PROGRESS NOTES
Attending Physician Statement    Wt Readings from Last 3 Encounters:   06/08/22 239 lb (108.4 kg)   04/19/22 239 lb (108.4 kg)   01/11/22 220 lb 12.8 oz (100.2 kg)     Temp Readings from Last 3 Encounters:   10/19/22 97.2 °F (36.2 °C) (Oral)   06/16/22 98.1 °F (36.7 °C) (Temporal)   01/11/22 96.8 °F (36 °C) (Temporal)     BP Readings from Last 3 Encounters:   10/19/22 (!) 138/98   06/16/22 138/89   06/08/22 135/85     Pulse Readings from Last 3 Encounters:   10/19/22 98   06/16/22 85   06/08/22 98         I have discussed the care of Fara Malone, including pertinent history and exam findings with the resident. I have reviewed the key elements of all parts of the encounter with the resident. I agree with the assessment, plan and orders as documented by the resident.   (GE Modifier)

## 2023-02-02 ENCOUNTER — OFFICE VISIT (OUTPATIENT)
Dept: FAMILY MEDICINE CLINIC | Age: 43
End: 2023-02-02
Payer: MEDICARE

## 2023-02-02 VITALS — TEMPERATURE: 98.1 F | SYSTOLIC BLOOD PRESSURE: 123 MMHG | DIASTOLIC BLOOD PRESSURE: 77 MMHG | HEART RATE: 84 BPM

## 2023-02-02 DIAGNOSIS — L30.8 ASTEOTOTIC DERMATITIS: Primary | ICD-10-CM

## 2023-02-02 DIAGNOSIS — E55.9 VITAMIN D DEFICIENCY: ICD-10-CM

## 2023-02-02 PROCEDURE — G8417 CALC BMI ABV UP PARAM F/U: HCPCS | Performed by: STUDENT IN AN ORGANIZED HEALTH CARE EDUCATION/TRAINING PROGRAM

## 2023-02-02 PROCEDURE — G8427 DOCREV CUR MEDS BY ELIG CLIN: HCPCS | Performed by: STUDENT IN AN ORGANIZED HEALTH CARE EDUCATION/TRAINING PROGRAM

## 2023-02-02 PROCEDURE — G8482 FLU IMMUNIZE ORDER/ADMIN: HCPCS | Performed by: STUDENT IN AN ORGANIZED HEALTH CARE EDUCATION/TRAINING PROGRAM

## 2023-02-02 PROCEDURE — 99213 OFFICE O/P EST LOW 20 MIN: CPT | Performed by: STUDENT IN AN ORGANIZED HEALTH CARE EDUCATION/TRAINING PROGRAM

## 2023-02-02 PROCEDURE — 1036F TOBACCO NON-USER: CPT | Performed by: STUDENT IN AN ORGANIZED HEALTH CARE EDUCATION/TRAINING PROGRAM

## 2023-02-02 RX ORDER — VITAMIN B COMPLEX
1000 TABLET ORAL DAILY
Qty: 60 TABLET | Refills: 5 | Status: SHIPPED | OUTPATIENT
Start: 2023-02-02

## 2023-02-02 RX ORDER — TRIAMCINOLONE ACETONIDE 0.25 MG/G
CREAM TOPICAL
Qty: 80 G | Refills: 0 | Status: SHIPPED | OUTPATIENT
Start: 2023-02-02

## 2023-02-02 ASSESSMENT — ENCOUNTER SYMPTOMS
ABDOMINAL PAIN: 0
WHEEZING: 0
VOMITING: 0
NAUSEA: 0
SHORTNESS OF BREATH: 0

## 2023-02-02 NOTE — PATIENT INSTRUCTIONS
Thank you for letting us take care of you today. We hope all your questions were addressed. If a question was overlooked or something else comes to mind after you return home, please contact a member of your Care Team listed below. Your Care Team at [place] [address] is Team #3  Dennis Lin MD (Faculty)  Brooklyn Mckay MD (Faculty  Mandiea Simmonds, MD (Resident)  Adrianna Bailey (Resident)   Jim Chavez MD (Resident)  FLORIDA Shane., [facility]  Terry Arellano., [facility] ([address] [facility])  Jarrett Vila, [address] [facility] ([address] [facility])    Rafa Devlin, [facility] (Clinical Pharmacist)     Office phone number: [phone]    If you need to get in right away due to illness, please be advised we have \"Same Day\" appointments available Monday-Friday. Please call us at [phone] option #3 to schedule your \"Same Day\" appointment.

## 2023-02-02 NOTE — PROGRESS NOTES
Subjective:    Dunia Paz is a 36 y.o. female with  has a past medical history of Abnormal Pap smear of cervix, Anxiety, Breast cyst, left, Depression, GERD (gastroesophageal reflux disease), and Headache. Family History   Problem Relation Age of Onset    No Known Problems Mother     Asthma Father     Diabetes Maternal Grandmother         IDDM    Heart Attack Maternal Grandmother     Stroke Maternal Grandmother     Heart Attack Maternal Grandfather     No Known Problems Paternal Grandmother     No Known Problems Paternal Grandfather        Presented tothe office today for:  Chief Complaint   Patient presents with    Rash     Patient states that she notice rash a couple days ago itchy 5/10       HPI    Helga Hernandez is a 58-year-old female with a PMH significant for dermatitis and anxiety. Patient is here today with complaints of bilateral lower extremity rashes. Bilateral lower extremity rash: for one week, Mildly pruritic, no active bleeding but some of them have scabbed over, sometimes tender to touch. Distribution in no particular pattern. Patient denies anyone else in the household with similar lesions, checks house for infestation regularly including bedbugs. Patient had similar rash last winter. Denies new skin lotions, or detergents. Review of Systems   Constitutional:  Negative for chills, fatigue and fever. Respiratory:  Negative for shortness of breath and wheezing. Cardiovascular:  Negative for chest pain and palpitations. Gastrointestinal:  Negative for abdominal pain, nausea and vomiting. Skin:  Positive for rash. Neurological:  Negative for syncope, weakness, numbness and headaches.      Objective:    /77 (Site: Left Upper Arm, Position: Sitting, Cuff Size: Medium Adult)   Pulse 84   Temp 98.1 °F (36.7 °C) (Oral)    BP Readings from Last 3 Encounters:   02/02/23 123/77   10/19/22 (!) 138/98   06/16/22 138/89     Physical Exam  Constitutional:       General: She is not in acute distress. Appearance: Normal appearance. She is not ill-appearing. HENT:      Head: Normocephalic and atraumatic. Eyes:      Extraocular Movements: Extraocular movements intact. Cardiovascular:      Rate and Rhythm: Normal rate and regular rhythm. Pulses: Normal pulses. Pulmonary:      Effort: Pulmonary effort is normal. No respiratory distress. Breath sounds: No wheezing, rhonchi or rales. Abdominal:      General: Bowel sounds are normal. There is no distension. Palpations: Abdomen is soft. Tenderness: There is no abdominal tenderness. There is no guarding. Skin:     General: Skin is warm. Comments: Bilateral lower extremity-multiple patches with area of erythema and scaly appearance. No obvious drainage. Neurological:      General: No focal deficit present. Mental Status: She is alert and oriented to person, place, and time. Psychiatric:         Mood and Affect: Mood normal.         Lab Results   Component Value Date    WBC 5.0 01/11/2022    HGB 13.2 01/11/2022    HCT 42.9 01/11/2022     01/11/2022    CHOL 132 04/05/2019    TRIG 71 04/05/2019    HDL 58 04/05/2019    ALT 20 04/05/2019    AST 21 04/05/2019     01/11/2022    K 3.9 01/11/2022     01/11/2022    CREATININE 0.61 01/11/2022    BUN 8 01/11/2022    CO2 18 (L) 01/11/2022    TSH 1.83 01/11/2022    LABA1C 5.3 04/19/2022     Lab Results   Component Value Date    CALCIUM 9.0 01/11/2022     Lab Results   Component Value Date    LDLCHOLESTEROL 60 04/05/2019       Assessment and Plan:    1. Asteototic dermatitis  -Differentials include asteatotic dermatitis versus hidrotic eczema  -Prescribed Kenalog 0.025% topical cream  -F/u 4 weeks  - triamcinolone (KENALOG) 0.025 % cream; Apply topically 2 times daily. Dispense: 80 g; Refill: 0    2. Vitamin D deficiency    - Vitamin D (CHOLECALCIFEROL) 25 MCG (1000 UT) TABS tablet; Take 1 tablet by mouth daily  Dispense: 60 tablet;  Refill: 5          Requested Prescriptions     Signed Prescriptions Disp Refills    Vitamin D (CHOLECALCIFEROL) 25 MCG (1000 UT) TABS tablet 60 tablet 5     Sig: Take 1 tablet by mouth daily    triamcinolone (KENALOG) 0.025 % cream 80 g 0     Sig: Apply topically 2 times daily. Medications Discontinued During This Encounter   Medication Reason    Vitamin D (CHOLECALCIFEROL) 25 MCG (1000 UT) TABS tablet REORDER       No follow-ups on file. Lopez received counseling on the following healthy behaviors:   Reviewed prior labs and health maintenance  Continue current medications, diet and exercise. Discussed use, benefit, and side effects of prescribed medications. Barriers to medication compliance addressed. Patient given educational materials - see patient instructions  Was a self-tracking handout given in paper form or via [employer]? No:     Requested Prescriptions     Signed Prescriptions Disp Refills    Vitamin D (CHOLECALCIFEROL) 25 MCG (1000 UT) TABS tablet 60 tablet 5     Sig: Take 1 tablet by mouth daily    triamcinolone (KENALOG) 0.025 % cream 80 g 0     Sig: Apply topically 2 times daily. All patient questions answered. Patient voiced understanding. Quality Measures    There is no height or weight on file to calculate BMI. Elevated. Weight control planned discussed Healthy diet and regular exercise. BP: 123/77 Blood pressure is Normal. Treatment plan consists of No treatment change needed.     Lab Results   Component Value Date    LDLCHOLESTEROL 60 04/05/2019    (goal LDL reduction with dx if diabetes is 50% LDL reduction)      PHQ Scores 10/19/2022 4/19/2022 1/11/2022 1/7/2021 2/26/2019 2/19/2018 12/28/2016   PHQ2 Score 0 4 6 0 0 0 2   PHQ9 Score 10 15 17 0 0 0 2     Interpretation of Total Score Depression Severity: 1-4 = Minimal depression, 5-9 = Mild depression, 10-14 = Moderate depression, 15-19 = Moderately severe depression, 20-27 = Severe depression

## 2023-02-02 NOTE — PROGRESS NOTES
Visit Information    Have you changed or started any medications since your last visit including any over-the-counter medicines, vitamins, or herbal medicines? no   Have you stopped taking any of your medications? Is so, why? -  no  Are you having any side effects from any of your medications? - no    Have you seen any other physician or provider since your last visit?  no   Have you had any other diagnostic tests since your last visit?  no   Have you been seen in the emergency room and/or had an admission in a hospital since we last saw you?  no   Have you had your routine dental cleaning in the past 6 months?  no     Do you have an active MyChart account? If no, what is the barrier?   No:     Patient Care Team:  Gladis Armendariz DO as PCP - General (Family Medicine)  Gladis Armendariz DO as PCP - Empaneled Provider    Medical History Review  Past Medical, Family, and Social History reviewed and does not contribute to the patient presenting condition    Health Maintenance   Topic Date Due    COVID-19 Vaccine (1) Never done    Depression Monitoring  10/19/2023    Lipids  04/05/2024    Cervical cancer screen  09/17/2025    DTaP/Tdap/Td vaccine (3 - Td or Tdap) 02/19/2028    Flu vaccine  Completed    Hepatitis C screen  Completed    HIV screen  Completed    Hepatitis A vaccine  Aged Out    Hib vaccine  Aged Out    Meningococcal (ACWY) vaccine  Aged Out    Pneumococcal 0-64 years Vaccine  Aged Out

## 2023-03-06 ENCOUNTER — NURSE TRIAGE (OUTPATIENT)
Dept: OTHER | Age: 43
End: 2023-03-06

## 2023-03-06 NOTE — TELEPHONE ENCOUNTER
Reason for Disposition   Health Information question, no triage required and triager able to answer question    Protocols used: Information Only Call - No Triage-ADULT-AH    Non-Urgent. Courtesy notification of patient's concern. Patient calls and reports that she is a patient of Dr. Ina Fam and would like a refill of the [address] sent to [facility] on [address]. Patient states that the refills she has there are  and she needs a new refill prescription.

## 2023-03-07 ENCOUNTER — OFFICE VISIT (OUTPATIENT)
Dept: FAMILY MEDICINE CLINIC | Age: 43
End: 2023-03-07
Payer: MEDICAID

## 2023-03-07 VITALS
OXYGEN SATURATION: 99 % | TEMPERATURE: 98.2 F | HEIGHT: 70 IN | HEART RATE: 97 BPM | BODY MASS INDEX: 32.61 KG/M2 | WEIGHT: 227.8 LBS | DIASTOLIC BLOOD PRESSURE: 83 MMHG | SYSTOLIC BLOOD PRESSURE: 127 MMHG

## 2023-03-07 DIAGNOSIS — Z76.89 ENCOUNTER BEFORE STARTING MEDICATION: ICD-10-CM

## 2023-03-07 DIAGNOSIS — E66.09 CLASS 1 OBESITY DUE TO EXCESS CALORIES WITHOUT SERIOUS COMORBIDITY WITH BODY MASS INDEX (BMI) OF 32.0 TO 32.9 IN ADULT: ICD-10-CM

## 2023-03-07 DIAGNOSIS — L30.9 DERMATITIS: Primary | ICD-10-CM

## 2023-03-07 PROCEDURE — 99213 OFFICE O/P EST LOW 20 MIN: CPT

## 2023-03-07 RX ORDER — ETONOGESTREL AND ETHINYL ESTRADIOL 11.7; 2.7 MG/1; MG/1
INSERT, EXTENDED RELEASE VAGINAL
Qty: 4 EACH | Refills: 3 | Status: SHIPPED | OUTPATIENT
Start: 2023-03-07

## 2023-03-07 RX ORDER — AMMONIUM LACTATE 12 G/100G
CREAM TOPICAL
Qty: 385 G | Refills: 1 | Status: SHIPPED | OUTPATIENT
Start: 2023-03-07 | End: 2023-04-06

## 2023-03-07 ASSESSMENT — PATIENT HEALTH QUESTIONNAIRE - PHQ9
SUM OF ALL RESPONSES TO PHQ QUESTIONS 1-9: 1
SUM OF ALL RESPONSES TO PHQ QUESTIONS 1-9: 1
SUM OF ALL RESPONSES TO PHQ9 QUESTIONS 1 & 2: 1
SUM OF ALL RESPONSES TO PHQ QUESTIONS 1-9: 1
2. FEELING DOWN, DEPRESSED OR HOPELESS: 0
1. LITTLE INTEREST OR PLEASURE IN DOING THINGS: 1
SUM OF ALL RESPONSES TO PHQ QUESTIONS 1-9: 1

## 2023-03-07 ASSESSMENT — ENCOUNTER SYMPTOMS: GASTROINTESTINAL NEGATIVE: 1

## 2023-03-07 NOTE — PROGRESS NOTES
[address]    Family [facility] Residency Program - Outpatient Note      Subjective:    Anay Rodriguez is a 36 y.o. female with  has a past medical history of Abnormal Pap smear of cervix, Anxiety, Breast cyst, left, Depression, GERD (gastroesophageal reflux disease), and Headache. Presented to the office today for:  Chief Complaint   Patient presents with    Rash     Follow up / patient states her rash has gotten worst since last visit     Weight Loss     Patient would like to discuss        HPI  Established patient 35 yo F here for f/u appointment. Patient has dry skin and rash bilateral lower extremities. Diagnosed asteototic eczema at last visit. Reports kenalog is not helping. Obesity: Patient reports she has made dietary changes, increase physical activity but not successful. Semaglutide was ordered but never approved for weight management. Patient is interested in pharmacologic therapy, adipex. Patient reported no hx of heart disease, no meds. Review of Systems   Constitutional: Negative. HENT: Negative. Gastrointestinal: Negative. Genitourinary: Negative. Musculoskeletal: Negative. Skin:  Positive for rash. Neurological: Negative.                 The patient has a   Family History   Problem Relation Age of Onset    No Known Problems Mother     Asthma Father     Diabetes Maternal Grandmother         IDDM    Heart Attack Maternal Grandmother     Stroke Maternal Grandmother     Heart Attack Maternal Grandfather     No Known Problems Paternal Grandmother     No Known Problems Paternal Grandfather        Objective:    /83 (Site: Left Upper Arm, Position: Sitting, Cuff Size: Large Adult) Comment: Manual  Pulse 97   Temp 98.2 °F (36.8 °C)   Ht 5' 10\" (1.778 m)   Wt 227 lb 12.8 oz (103.3 kg)   SpO2 99%   BMI 32.69 kg/m²    BP Readings from Last 3 Encounters:   03/07/23 127/83   02/02/23 123/77   10/19/22 (!) 138/98       Physical Exam  Vitals and nursing note reviewed. Cardiovascular:      Rate and Rhythm: Normal rate and regular rhythm. Pulses: Normal pulses. Heart sounds: Normal heart sounds. Pulmonary:      Effort: Pulmonary effort is normal.      Breath sounds: Normal breath sounds. Abdominal:      General: Bowel sounds are normal.      Palpations: Abdomen is soft. Skin:     General: Skin is dry. Findings: Rash present. Neurological:      Mental Status: She is alert and oriented to person, place, and time. Lab Results   Component Value Date    WBC 5.0 01/11/2022    HGB 13.2 01/11/2022    HCT 42.9 01/11/2022     01/11/2022    CHOL 132 04/05/2019    TRIG 71 04/05/2019    HDL 58 04/05/2019    ALT 20 04/05/2019    AST 21 04/05/2019     01/11/2022    K 3.9 01/11/2022     01/11/2022    CREATININE 0.61 01/11/2022    BUN 8 01/11/2022    CO2 18 (L) 01/11/2022    TSH 1.83 01/11/2022    LABA1C 5.3 04/19/2022     Lab Results   Component Value Date    CALCIUM 9.0 01/11/2022     Lab Results   Component Value Date    LDLCHOLESTEROL 60 04/05/2019       Assessment and Plan:    1. Encounter before starting medication  BMI 32.69, class I obesity. Patient is interested in Adipex and inquiring about the medication. Patient has a history of ADD on Vyvanse 50 mg currently, following up with a psychiartist in 2 weeks. Was explained in detail that patient cannot be put on Adipex because she is on vyvanse. Patient reported she will stop taking vyvanse so that she can be started on Adipex. Patient was counseled in detail to not stop her medication without talking to her psychiatrist.  Patient states that she will go talk to her psychiatrist to see if she can discontinue vyvanse. It was clearly communicated that she can not be on both medications. Patient voices understanding.      - Urine Drug Screen; Future    2. Dermatitis    - ammonium lactate (LAC-HYDRIN) 12 % cream; Apply topically as needed.   Dispense: 385 g; Refill: 1        Requested Prescriptions     Signed Prescriptions Disp Refills    ammonium lactate (LAC-HYDRIN) 12 % cream 385 g 1     Sig: Apply topically as needed.       There are no discontinued medications.    Lopez received counseling on the following healthy behaviors: nutrition, exercise and medication adherence    Discussed use,benefit, and side effects of prescribed medications.  Barriers to medication compliance addressed.      All patient questions answered.  Pt voiced understanding.     Return in about 3 months (around 6/7/2023).        Disclaimer: Some orall of this note was transcribed using voice-recognition software.This may cause typographical errors occasionally. Although all effort is made to fix these errors, please do not hesitate to contact our office if there isany concern with the understanding of this note.

## 2023-03-07 NOTE — PROGRESS NOTES
Attending Physician Statement  I  have discussed the care of Renato Brady including pertinent history and exam findings with the resident. I agree with the assessment, plan and orders as documented by the resident. Seborrheic dermatitis  Getting better w/ Kenalog cream  Discussed weight loss management           /83 (Site: Left Upper Arm, Position: Sitting, Cuff Size: Large Adult) Comment: Manual  Pulse 97   Temp 98.2 °F (36.8 °C)   Ht 5' 10\" (1.778 m)   Wt 227 lb 12.8 oz (103.3 kg)   SpO2 99%   BMI 32.69 kg/m²    BP Readings from Last 3 Encounters:   03/07/23 127/83   02/02/23 123/77   10/19/22 (!) 138/98     Wt Readings from Last 3 Encounters:   03/07/23 227 lb 12.8 oz (103.3 kg)   06/08/22 239 lb (108.4 kg)   04/19/22 239 lb (108.4 kg)          Diagnosis Orders   1. Dermatitis  ammonium lactate (LAC-HYDRIN) 12 % cream      2. Encounter before starting medication  Urine Drug Screen      3.  Class 1 obesity due to excess calories without serious comorbidity with body mass index (BMI) of 32.0 to 32.9 in adult            Carmen Castillo MD 3/9/2023 1:42 PM

## 2023-03-07 NOTE — PROGRESS NOTES
Visit Information    Have you changed or started any medications since your last visit including any over-the-counter medicines, vitamins, or herbal medicines? no   Have you stopped taking any of your medications? Is so, why? -  no  Are you having any side effects from any of your medications? - no    Have you seen any other physician or provider since your last visit?  no   Have you had any other diagnostic tests since your last visit?  no   Have you been seen in the emergency room and/or had an admission in a hospital since we last saw you?  no   Have you had your routine dental cleaning in the past 6 months?  no     Do you have an active MyChart account? If no, what is the barrier?   Yes    Patient Care Team:  Amarilis Alejandra DO as PCP - General (Family Medicine)  Amarilis Alejandra DO as PCP - Empaneled Provider    Medical History Review  Past Medical, Family, and Social History reviewed and does not contribute to the patient presenting condition    Health Maintenance   Topic Date Due    COVID-19 Vaccine (1) Never done    Depression Monitoring  10/19/2023    Lipids  04/05/2024    Cervical cancer screen  09/17/2025    DTaP/Tdap/Td vaccine (3 - Td or Tdap) 02/19/2028    Flu vaccine  Completed    Hepatitis C screen  Completed    HIV screen  Completed    Hepatitis A vaccine  Aged Out    Hib vaccine  Aged Out    Meningococcal (ACWY) vaccine  Aged Out    Pneumococcal 0-64 years Vaccine  Aged Out

## 2023-05-11 ENCOUNTER — OFFICE VISIT (OUTPATIENT)
Dept: FAMILY MEDICINE CLINIC | Age: 43
End: 2023-05-11
Payer: MEDICAID

## 2023-05-11 VITALS — BODY MASS INDEX: 32 KG/M2 | DIASTOLIC BLOOD PRESSURE: 92 MMHG | SYSTOLIC BLOOD PRESSURE: 142 MMHG | WEIGHT: 223 LBS

## 2023-05-11 DIAGNOSIS — I10 PRIMARY HYPERTENSION: Primary | ICD-10-CM

## 2023-05-11 DIAGNOSIS — B35.1 ONYCHOMYCOSIS: ICD-10-CM

## 2023-05-11 PROCEDURE — 3074F SYST BP LT 130 MM HG: CPT

## 2023-05-11 PROCEDURE — 99213 OFFICE O/P EST LOW 20 MIN: CPT

## 2023-05-11 PROCEDURE — 3078F DIAST BP <80 MM HG: CPT

## 2023-05-11 RX ORDER — AMLODIPINE BESYLATE 5 MG/1
5 TABLET ORAL DAILY
Qty: 30 TABLET | Refills: 3 | Status: SHIPPED | OUTPATIENT
Start: 2023-05-11

## 2023-05-11 ASSESSMENT — ENCOUNTER SYMPTOMS
GASTROINTESTINAL NEGATIVE: 1
RESPIRATORY NEGATIVE: 1
EYES NEGATIVE: 1

## 2023-09-06 DIAGNOSIS — I10 PRIMARY HYPERTENSION: ICD-10-CM

## 2023-09-06 RX ORDER — AMLODIPINE BESYLATE 5 MG/1
TABLET ORAL
Qty: 30 TABLET | Refills: 3 | Status: SHIPPED | OUTPATIENT
Start: 2023-09-06

## 2023-09-06 NOTE — TELEPHONE ENCOUNTER
E-scribe request for AMLODIPINE. Please review and e-scribe if applicable.      Last Visit Date:  5/11/2023  Next Visit Date:  Visit date not found    Hemoglobin A1C (%)   Date Value   04/19/2022 5.3             ( goal A1C is < 7)   No components found for: LABMICR  LDL Cholesterol (mg/dL)   Date Value   04/05/2019 60       (goal LDL is <100)   AST (U/L)   Date Value   04/05/2019 21     ALT (U/L)   Date Value   04/05/2019 20     BUN (mg/dL)   Date Value   01/11/2022 8     BP Readings from Last 3 Encounters:   05/11/23 (!) 142/92   03/07/23 127/83   02/02/23 123/77          (goal 120/80)        Patient Active Problem List:     Gallstones     Anxiety     Stress     Psychophysiological insomnia     Adjustment disorder with mixed anxiety and depressed mood     Seborrheic dermatitis     Dermatitis     Cervical paraspinous muscle spasm     Neck pain     Encounter before starting medication     Primary hypertension     Onychomycosis      ----JF

## 2024-01-15 DIAGNOSIS — I10 PRIMARY HYPERTENSION: ICD-10-CM

## 2024-01-15 RX ORDER — AMLODIPINE BESYLATE 5 MG/1
5 TABLET ORAL DAILY
Qty: 30 TABLET | Refills: 5 | Status: SHIPPED | OUTPATIENT
Start: 2024-01-15

## 2024-01-15 RX ORDER — AMLODIPINE BESYLATE 5 MG/1
TABLET ORAL
Qty: 30 TABLET | Refills: 3 | OUTPATIENT
Start: 2024-01-15

## 2024-01-15 NOTE — TELEPHONE ENCOUNTER
Last visit: 05/11/2023  Last Med refill: 09/06/2023  Does patient have enough medication for 72 hours: No:     Tried to contact patient voice mail full/ unable to leave message     Next Visit Date:  No future appointments.    Health Maintenance   Topic Date Due    COVID-19 Vaccine (1) Never done    Flu vaccine (1) 08/01/2023    Depression Screen  03/07/2024    Lipids  04/05/2024    Cervical cancer screen  09/17/2025    DTaP/Tdap/Td vaccine (3 - Td or Tdap) 02/19/2028    Hepatitis B vaccine  Completed    Hepatitis C screen  Completed    HIV screen  Completed    Hepatitis A vaccine  Aged Out    Hib vaccine  Aged Out    HPV vaccine  Aged Out    Polio vaccine  Aged Out    Meningococcal (ACWY) vaccine  Aged Out    Pneumococcal 0-64 years Vaccine  Aged Out    Depression Monitoring  Discontinued       Hemoglobin A1C (%)   Date Value   04/19/2022 5.3             ( goal A1C is < 7)   No components found for: \"LABMICR\"  LDL Cholesterol (mg/dL)   Date Value   04/05/2019 60       (goal LDL is <100)   AST (U/L)   Date Value   04/05/2019 21     ALT (U/L)   Date Value   04/05/2019 20     BUN (mg/dL)   Date Value   01/11/2022 8     BP Readings from Last 3 Encounters:   05/11/23 (!) 142/92   03/07/23 127/83   02/02/23 123/77          (goal 120/80)    All Future Testing planned in [facility]  Lab Frequency Next Occurrence   Urine Drug Screen Once 03/07/2023               Patient Active Problem List:     Gallstones     Anxiety     Stress     Psychophysiological insomnia     Adjustment disorder with mixed anxiety and depressed mood     Seborrheic dermatitis     Dermatitis     Cervical paraspinous muscle spasm     Neck pain     Encounter before starting medication     Primary hypertension     Onychomycosis

## 2024-02-05 ENCOUNTER — OFFICE VISIT (OUTPATIENT)
Dept: FAMILY MEDICINE CLINIC | Age: 44
End: 2024-02-05
Payer: MEDICAID

## 2024-02-05 VITALS
SYSTOLIC BLOOD PRESSURE: 139 MMHG | DIASTOLIC BLOOD PRESSURE: 88 MMHG | BODY MASS INDEX: 31.15 KG/M2 | WEIGHT: 217.6 LBS | HEIGHT: 70 IN | HEART RATE: 75 BPM

## 2024-02-05 DIAGNOSIS — E55.9 VITAMIN D DEFICIENCY: ICD-10-CM

## 2024-02-05 DIAGNOSIS — I10 PRIMARY HYPERTENSION: Primary | ICD-10-CM

## 2024-02-05 DIAGNOSIS — Z13.1 SCREENING FOR DIABETES MELLITUS: ICD-10-CM

## 2024-02-05 DIAGNOSIS — Z00.00 ENCOUNTER FOR WELL ADULT EXAM WITHOUT ABNORMAL FINDINGS: ICD-10-CM

## 2024-02-05 LAB — HBA1C MFR BLD: 5.5 %

## 2024-02-05 PROCEDURE — 3079F DIAST BP 80-89 MM HG: CPT

## 2024-02-05 PROCEDURE — 83036 HEMOGLOBIN GLYCOSYLATED A1C: CPT

## 2024-02-05 PROCEDURE — 99396 PREV VISIT EST AGE 40-64: CPT

## 2024-02-05 PROCEDURE — 3075F SYST BP GE 130 - 139MM HG: CPT

## 2024-02-05 RX ORDER — LANOLIN ALCOHOL/MO/W.PET/CERES
CREAM (GRAM) TOPICAL
Qty: 454 G | Refills: 0 | Status: SHIPPED | OUTPATIENT
Start: 2024-02-05

## 2024-02-05 SDOH — ECONOMIC STABILITY: INCOME INSECURITY: HOW HARD IS IT FOR YOU TO PAY FOR THE VERY BASICS LIKE FOOD, HOUSING, MEDICAL CARE, AND HEATING?: NOT HARD AT ALL

## 2024-02-05 SDOH — ECONOMIC STABILITY: FOOD INSECURITY: WITHIN THE PAST 12 MONTHS, THE FOOD YOU BOUGHT JUST DIDN'T LAST AND YOU DIDN'T HAVE MONEY TO GET MORE.: NEVER TRUE

## 2024-02-05 SDOH — ECONOMIC STABILITY: FOOD INSECURITY: WITHIN THE PAST 12 MONTHS, YOU WORRIED THAT YOUR FOOD WOULD RUN OUT BEFORE YOU GOT MONEY TO BUY MORE.: NEVER TRUE

## 2024-02-05 SDOH — ECONOMIC STABILITY: HOUSING INSECURITY
IN THE LAST 12 MONTHS, WAS THERE A TIME WHEN YOU DID NOT HAVE A STEADY PLACE TO SLEEP OR SLEPT IN A SHELTER (INCLUDING NOW)?: NO

## 2024-02-05 ASSESSMENT — PATIENT HEALTH QUESTIONNAIRE - PHQ9
2. FEELING DOWN, DEPRESSED OR HOPELESS: NOT AT ALL
SUM OF ALL RESPONSES TO PHQ QUESTIONS 1-9: 0
SUM OF ALL RESPONSES TO PHQ9 QUESTIONS 1 & 2: 0
2. FEELING DOWN, DEPRESSED OR HOPELESS: 0
SUM OF ALL RESPONSES TO PHQ QUESTIONS 1-9: 0
SUM OF ALL RESPONSES TO PHQ9 QUESTIONS 1 & 2: 0
1. LITTLE INTEREST OR PLEASURE IN DOING THINGS: NOT AT ALL
SUM OF ALL RESPONSES TO PHQ QUESTIONS 1-9: 0
SUM OF ALL RESPONSES TO PHQ QUESTIONS 1-9: 0
1. LITTLE INTEREST OR PLEASURE IN DOING THINGS: 0

## 2024-02-05 NOTE — PROGRESS NOTES
Visit Information    Have you changed or started any medications since your last visit including any over-the-counter medicines, vitamins, or herbal medicines? no   Have you stopped taking any of your medications? Is so, why? -  no  Are you having any side effects from any of your medications? - no    Have you seen any other physician or provider since your last visit?  no   Have you had any other diagnostic tests since your last visit?  no   Have you been seen in the emergency room and/or had an admission in a hospital since we last saw you?  no   Have you had your routine dental cleaning in the past 6 months?  no     Do you have an active MyChart account? If no, what is the barrier?  Yes    Patient Care Team:  Donnie Flores MD as PCP - General (Family Medicine)    Medical History Review  Past Medical, Family, and Social History reviewed and does not contribute to the patient presenting condition    Health Maintenance   Topic Date Due    COVID-19 Vaccine (1) Never done    Flu vaccine (1) 08/01/2023    Depression Screen  03/07/2024    Lipids  04/05/2024    Cervical cancer screen  09/17/2025    DTaP/Tdap/Td vaccine (3 - Td or Tdap) 02/19/2028    Hepatitis B vaccine  Completed    Hepatitis C screen  Completed    HIV screen  Completed    Hepatitis A vaccine  Aged Out    Hib vaccine  Aged Out    HPV vaccine  Aged Out    Polio vaccine  Aged Out    Meningococcal (ACWY) vaccine  Aged Out    Pneumococcal 0-64 years Vaccine  Aged Out    Depression Monitoring  Discontinued

## 2024-02-05 NOTE — PATIENT INSTRUCTIONS
Thank you for letting us take care of you today. We hope all your questions were addressed. If a question was overlooked or something else comes to mind after you return home, please contact a member of your Care Team listed below.        Your Care Team at [facility] is Team #4  Christopher Kilpatrick (Faculty)  Miri Evans (Resident)  Jewels Marrero (Resident)  Cy Wright (Resident)  Janel Frey (Resident)  Racquel Cline (Resident)  Renata Steve, A  Herber Trujillo, A  Yumiko Lopez, A  Patsy Soliz, [facility]  Pamela Diop, [facility]  Kristin Akhtar, [facility]  Deb Lai, A  Meg Hopson, A  Steve (LJ) BRAYAN Waggoner (Clinical Practice Manager)  Belen Allen RPH (Clinical Pharmacist)       Office phone number: [phone]    If you need to get in right away due to illness, please be advised we have \"Same Day\" appointments available Monday-Friday. Please call us at [phone] option #3 to schedule your \"Same Day\" appointment.          Starting a Weight Loss Plan: Care Instructions  Overview     If you're thinking about losing weight, it can be hard to know where to start. Your doctor can help you set up a weight loss plan that best meets your needs. You may want to take a class on nutrition or exercise, or you could join a weight loss support group. If you have questions about how to make changes to your eating or exercise habits, ask your doctor about seeing a registered dietitian or an exercise specialist.  It can be a big challenge to lose weight. But you don't have to make huge changes at once. Make small changes, and stick with them. When those changes become habit, add a few more changes.  If you don't think you're ready to make changes right now, try to pick a date in the future. Make an appointment to see your doctor to discuss whether the time is right for you to start a plan.  Follow-up care is a key part of your treatment and safety. Be sure to make and go to all appointments, and call your

## 2024-02-05 NOTE — PROGRESS NOTES
Attending Physician Statement  I  have discussed the care of [place] Weathers including pertinent history and exam findings with the resident. I agree with the assessment, plan and orders as documented by the resident.      /88 (Site: Right Upper Arm, Position: Sitting, Cuff Size: Medium Adult) Comment: machine  Pulse 75   Ht 1.778 m (5' 10\")   Wt 98.7 kg (217 lb 9.6 oz)   BMI 31.22 kg/m²    BP Readings from Last 3 Encounters:   02/05/24 139/88   05/11/23 (!) 142/92   03/07/23 127/83     Wt Readings from Last 3 Encounters:   02/05/24 98.7 kg (217 lb 9.6 oz)   05/11/23 101.2 kg (223 lb)   03/07/23 103.3 kg (227 lb 12.8 oz)          Diagnosis Orders   1. Primary hypertension  Basic Metabolic Panel    Hepatic Function Panel    CBC      2. Screening for diabetes mellitus  POCT glycosylated hemoglobin (Hb A1C)      3. Vitamin D deficiency  Vitamin D 25 Hydroxy      4. Encounter for well adult exam without abnormal findings                Laura Miller MD 3/1/2024 11:10 AM

## 2024-02-06 NOTE — PROGRESS NOTES
John Trujillo is 43 y.o.  Established patient      Screening for DM Required at this time   - Pt was not diagnosed with DM in the past   - Pt does meet Criteria of age (35-70) with being overweigh or obese given 30-34.9 - Obesity Grade I  - Pt does have Rfs High risk group ( / , or  American, Black ,  , or /)        Essential HTN: on Norvasc, as per pt discontinue it as she belived it caused her tremor, pt also on Vyvanse  and Pristiq that she gets regular refills from her psychiatrist. Pt aware of the SE of the above medication which can contribute to HTN and working with the specialist on tapering. Pt has dx with HTN in 2017 during pregnancy with her first kid and has not been resolved since, tried Labetalol but also dc due to SE.       Review of Systems   Constitutional:  Negative for activity change, appetite change, chills, diaphoresis, fatigue, fever and unexpected weight change.   Respiratory:  Negative for cough, chest tightness and shortness of breath.    Cardiovascular:  Negative for chest pain, palpitations and leg swelling.   Gastrointestinal:  Negative for abdominal pain, constipation, diarrhea, nausea and vomiting.   Neurological:  Negative for weakness, light-headedness, numbness and headaches.        Vitals:    02/05/24 1558   BP: 139/88   Pulse: 75       Physical Exam  Cardiovascular:      Rate and Rhythm: Normal rate and regular rhythm.      Pulses: Normal pulses.      Heart sounds: Normal heart sounds.   Pulmonary:      Effort: Pulmonary effort is normal.      Breath sounds: Normal breath sounds.   Musculoskeletal:      Right lower leg: No edema.      Left lower leg: No edema.           Diagnosis Orders   1. Primary hypertension  Basic Metabolic Panel    Hepatic Function Panel    CBC      2. Screening for diabetes mellitus  POCT glycosylated hemoglobin (Hb A1C)      3. Vitamin D deficiency  Vitamin D 25 Hydroxy

## 2024-02-16 ENCOUNTER — HOSPITAL ENCOUNTER (OUTPATIENT)
Age: 44
Setting detail: SPECIMEN
Discharge: HOME | End: 2024-02-16

## 2024-02-16 DIAGNOSIS — E55.9 VITAMIN D DEFICIENCY: ICD-10-CM

## 2024-02-16 DIAGNOSIS — I10 PRIMARY HYPERTENSION: ICD-10-CM

## 2024-02-16 LAB
25(OH)D3 SERPL-MCNC: 15.6 NG/ML
ALBUMIN SERPL-MCNC: 4.1 G/DL (ref 3.5–5.2)
ALBUMIN/GLOB SERPL: 1.2 {RATIO} (ref 1–2.5)
ALP SERPL-CCNC: 63 U/L (ref 35–104)
ALT SERPL-CCNC: 13 U/L (ref 5–33)
ANION GAP SERPL CALCULATED.3IONS-SCNC: 13 MMOL/L (ref 9–17)
AST SERPL-CCNC: 16 U/L
BILIRUB DIRECT SERPL-MCNC: 0.1 MG/DL
BILIRUB INDIRECT SERPL-MCNC: 0.2 MG/DL (ref 0–1)
BILIRUB SERPL-MCNC: 0.3 MG/DL (ref 0.3–1.2)
BUN SERPL-MCNC: 6 MG/DL (ref 6–20)
CALCIUM SERPL-MCNC: 8.8 MG/DL (ref 8.6–10.4)
CHLORIDE SERPL-SCNC: 102 MMOL/L (ref 98–107)
CO2 SERPL-SCNC: 20 MMOL/L (ref 20–31)
CREAT SERPL-MCNC: 0.6 MG/DL (ref 0.5–0.9)
ERYTHROCYTE [DISTWIDTH] IN BLOOD BY AUTOMATED COUNT: 15.2 % (ref 11.8–14.4)
GFR SERPL CREATININE-BSD FRML MDRD: >60 ML/MIN/1.73M2
GLUCOSE SERPL-MCNC: 73 MG/DL (ref 70–99)
HCT VFR BLD AUTO: 41.2 % (ref 36.3–47.1)
HGB BLD-MCNC: 12.5 G/DL (ref 11.9–15.1)
MCH RBC QN AUTO: 24.5 PG (ref 25.2–33.5)
MCHC RBC AUTO-ENTMCNC: 30.3 G/DL (ref 28.4–34.8)
MCV RBC AUTO: 80.6 FL (ref 82.6–102.9)
NRBC BLD-RTO: 0 PER 100 WBC
PLATELET # BLD AUTO: 243 K/UL (ref 138–453)
PMV BLD AUTO: 11.4 FL (ref 8.1–13.5)
POTASSIUM SERPL-SCNC: 3.6 MMOL/L (ref 3.7–5.3)
PROT SERPL-MCNC: 7.6 G/DL (ref 6.4–8.3)
RBC # BLD AUTO: 5.11 M/UL (ref 3.95–5.11)
SODIUM SERPL-SCNC: 135 MMOL/L (ref 135–144)
WBC OTHER # BLD: 5.7 K/UL (ref 3.5–11.3)

## 2024-02-25 DIAGNOSIS — E87.6 HYPOKALEMIA: ICD-10-CM

## 2024-02-25 DIAGNOSIS — E55.9 VITAMIN D DEFICIENCY: Primary | ICD-10-CM

## 2024-02-25 RX ORDER — ERGOCALCIFEROL 1.25 MG/1
50000 CAPSULE ORAL WEEKLY
Qty: 4 CAPSULE | Refills: 0 | Status: SHIPPED | OUTPATIENT
Start: 2024-02-25

## 2024-02-25 RX ORDER — POTASSIUM CHLORIDE 20 MEQ/1
20 TABLET, EXTENDED RELEASE ORAL ONCE
Qty: 1 TABLET | Refills: 0 | Status: SHIPPED | OUTPATIENT
Start: 2024-02-25 | End: 2024-02-25

## 2024-03-04 ENCOUNTER — TELEPHONE (OUTPATIENT)
Dept: FAMILY MEDICINE CLINIC | Age: 44
End: 2024-03-04

## 2024-03-04 DIAGNOSIS — E87.6 HYPOKALEMIA: ICD-10-CM

## 2024-03-04 NOTE — TELEPHONE ENCOUNTER
----- Message from Lillie Lowery MD sent at 2/25/2024  6:17 PM EST -----  Low vitamin and low potassium, please let the pt know script sent

## 2024-03-04 NOTE — TELEPHONE ENCOUNTER
Spoke with patient and informed her of the medications that were sent to her pharmacy for her low potassium and low vitamin D. Patient stated she picked that medication up today. Patient did mention that she has some concerns about her CBC results. Please advise or call patient.

## 2024-03-05 RX ORDER — POTASSIUM CHLORIDE 20 MEQ/1
TABLET, EXTENDED RELEASE ORAL
Qty: 1 TABLET | Refills: 0 | OUTPATIENT
Start: 2024-03-05

## 2024-03-21 DIAGNOSIS — E55.9 VITAMIN D DEFICIENCY: ICD-10-CM

## 2024-03-21 RX ORDER — ERGOCALCIFEROL 1.25 MG/1
50000 CAPSULE ORAL WEEKLY
Qty: 4 CAPSULE | Refills: 0 | Status: SHIPPED | OUTPATIENT
Start: 2024-03-21

## 2024-03-21 NOTE — TELEPHONE ENCOUNTER
Last visit: 02/05/2024  Last Med refill: 02/25/2024  Does patient have enough medication for 72 hours: no     Next Visit Date:  No future appointments.    Health Maintenance   Topic Date Due    COVID-19 Vaccine (1) Never done    Flu vaccine (1) 08/01/2023    Lipids  04/05/2024    Depression Screen  02/05/2025    Cervical cancer screen  09/17/2025    DTaP/Tdap/Td vaccine (3 - Td or Tdap) 02/19/2028    Hepatitis B vaccine  Completed    Hepatitis C screen  Completed    HIV screen  Completed    Hepatitis A vaccine  Aged Out    Hib vaccine  Aged Out    HPV vaccine  Aged Out    Polio vaccine  Aged Out    Meningococcal (ACWY) vaccine  Aged Out    Pneumococcal 0-64 years Vaccine  Aged Out    Depression Monitoring  Discontinued       Hemoglobin A1C (%)   Date Value   02/05/2024 5.5   04/19/2022 5.3             ( goal A1C is < 7)   No components found for: \"LABMICR\"  LDL Cholesterol (mg/dL)   Date Value   04/05/2019 60       (goal LDL is <100)   AST (U/L)   Date Value   02/16/2024 16     ALT (U/L)   Date Value   02/16/2024 13     BUN (mg/dL)   Date Value   02/16/2024 6     BP Readings from Last 3 Encounters:   02/05/24 139/88   05/11/23 (!) 142/92   03/07/23 127/83          (goal 120/80)    All Future Testing planned in [facility]  Lab Frequency Next Occurrence               Patient Active Problem List:     Gallstones     Anxiety     Stress     Psychophysiological insomnia     Adjustment disorder with mixed anxiety and depressed mood     Seborrheic dermatitis     Dermatitis     Cervical paraspinous muscle spasm     Neck pain     Encounter before starting medication     Primary hypertension     Onychomycosis

## 2024-03-25 NOTE — TELEPHONE ENCOUNTER
E-scribe request for SKIN PROTECTANTS. Please review and e-scribe if applicable.     Last Visit Date:  2/5/2024  Next Visit Date:  Visit date not found    Hemoglobin A1C (%)   Date Value   02/05/2024 5.5   04/19/2022 5.3             ( goal A1C is < 7)   No components found for: \"LABMICR\"  LDL Cholesterol (mg/dL)   Date Value   04/05/2019 60       (goal LDL is <100)   AST (U/L)   Date Value   02/16/2024 16     ALT (U/L)   Date Value   02/16/2024 13     BUN (mg/dL)   Date Value   02/16/2024 6     BP Readings from Last 3 Encounters:   02/05/24 139/88   05/11/23 (!) 142/92   03/07/23 127/83          (goal 120/80)        Patient Active Problem List:     Gallstones     Anxiety     Stress     Psychophysiological insomnia     Adjustment disorder with mixed anxiety and depressed mood     Seborrheic dermatitis     Dermatitis     Cervical paraspinous muscle spasm     Neck pain     Encounter before starting medication     Primary hypertension     Onychomycosis      ----JF

## 2024-03-26 RX ORDER — LANOLIN ALCOHOL/MO/W.PET/CERES
CREAM (GRAM) TOPICAL
Qty: 454 G | Refills: 0 | Status: SHIPPED | OUTPATIENT
Start: 2024-03-26

## 2024-03-28 RX ORDER — ETONOGESTREL AND ETHINYL ESTRADIOL VAGINAL RING .015; .12 MG/D; MG/D
RING VAGINAL
OUTPATIENT
Start: 2024-03-28

## 2024-04-02 RX ORDER — ETONOGESTREL AND ETHINYL ESTRADIOL VAGINAL RING .015; .12 MG/D; MG/D
RING VAGINAL
Qty: 2 EACH | Refills: 0 | Status: SHIPPED | OUTPATIENT
Start: 2024-04-02

## 2024-04-02 RX ORDER — ETONOGESTREL AND ETHINYL ESTRADIOL VAGINAL RING .015; .12 MG/D; MG/D
RING VAGINAL
Qty: 4 EACH | Refills: 0 | Status: CANCELLED | OUTPATIENT
Start: 2024-04-02

## 2024-04-02 RX ORDER — ETONOGESTREL AND ETHINYL ESTRADIOL VAGINAL RING .015; .12 MG/D; MG/D
RING VAGINAL
OUTPATIENT
Start: 2024-04-02

## 2024-04-02 NOTE — TELEPHONE ENCOUNTER
GYN pt last seen in office 10/7/21  Next appt for annual 4/23/24    Calling in for a refill on her BC to hold her until her appt

## 2024-04-18 DIAGNOSIS — E55.9 VITAMIN D DEFICIENCY: ICD-10-CM

## 2024-04-18 RX ORDER — ERGOCALCIFEROL 1.25 MG/1
50000 CAPSULE ORAL WEEKLY
Qty: 4 CAPSULE | Refills: 0 | Status: SHIPPED | OUTPATIENT
Start: 2024-04-18

## 2024-04-18 NOTE — TELEPHONE ENCOUNTER
E-scribe request for VITAMIN D. Please review and e-scribe if applicable.     Last Visit Date:  2/5/2024  Next Visit Date:  Visit date not found    Hemoglobin A1C (%)   Date Value   02/05/2024 5.5   04/19/2022 5.3             ( goal A1C is < 7)   No components found for: \"LABMICR\"  LDL Cholesterol (mg/dL)   Date Value   04/05/2019 60       (goal LDL is <100)   AST (U/L)   Date Value   02/16/2024 16     ALT (U/L)   Date Value   02/16/2024 13     BUN (mg/dL)   Date Value   02/16/2024 6     BP Readings from Last 3 Encounters:   02/05/24 139/88   05/11/23 (!) 142/92   03/07/23 127/83          (goal 120/80)        Patient Active Problem List:     Gallstones     Anxiety     Stress     Psychophysiological insomnia     Adjustment disorder with mixed anxiety and depressed mood     Seborrheic dermatitis     Dermatitis     Cervical paraspinous muscle spasm     Neck pain     Encounter before starting medication     Primary hypertension     Onychomycosis      ----JF

## 2024-05-07 RX ORDER — ETONOGESTREL AND ETHINYL ESTRADIOL VAGINAL RING .015; .12 MG/D; MG/D
RING VAGINAL
Qty: 2 EACH | Refills: 1 | Status: SHIPPED | OUTPATIENT
Start: 2024-05-07

## 2024-05-19 NOTE — PROGRESS NOTES
without murmur, rubs or gallops.    Abdomen:  The abdomen was soft and non-tender with no guarding, rebound, CVAT or rigidity.  No hernias were appreciated.  Bowel sounds were normally active.    Pelvic exam:  No vulvar, vaginal or cervical lesions are noted.  Creamy white vaginal discharge present, no significant cystocele, rectocele or enterocele noted.  Uterus nongravid and without CMT and adnexa nontender and without abnormal masses bilaterally.  Rectum without external lesions noted.    Extremities:  FROM and nontender without clubbing cyanosis or edema.     ASSESSMENT:         ICD-10-CM    1. Encounter for gynecological examination without abnormal finding  Z01.419       2. Visit for screening mammogram  Z12.31                       PLAN:  If Negative Cytology, Follow-up screening per current guidelines.    Mammograms every 1year. If 40 yo and last mammogram was negative.  Mixed incontinence - discussed bladder training and kegel exercises.  Info given.  Calcium and Vitamin D dosing reviewed.  Colonoscopy screening reviewed as well as onset for bone density testing.  Birth control and barrier recommendations discussed.  STD counseling and prevention reviewed.  Gardasil counseling completed for all patients 9-44 yo.  Routine health maintenance per patients PCP.    Vaginitis probe done and she will be contacted with results.  Return to the office in 1 year or when necessary  Patient was seen with total face to face time of 20 minutes.     Nii Shah M.D., Mph ,FACOG.   [facility]

## 2024-05-19 NOTE — PATIENT INSTRUCTIONS
Please get your mammogram done as scheduled.  We will let you know that result as well as today's Pap.  1 years refill on your birth control was sent to the pharmacy.  Plan on returning in 1 year.  Enjoy the summer and the rest of 2024!

## 2024-05-23 ENCOUNTER — OFFICE VISIT (OUTPATIENT)
Dept: OBGYN CLINIC | Age: 44
End: 2024-05-23
Payer: MEDICAID

## 2024-05-23 ENCOUNTER — HOSPITAL ENCOUNTER (OUTPATIENT)
Age: 44
Setting detail: SPECIMEN
Discharge: HOME | End: 2024-05-23

## 2024-05-23 VITALS
DIASTOLIC BLOOD PRESSURE: 86 MMHG | WEIGHT: 218 LBS | BODY MASS INDEX: 31.28 KG/M2 | HEART RATE: 94 BPM | SYSTOLIC BLOOD PRESSURE: 118 MMHG

## 2024-05-23 DIAGNOSIS — Z12.31 VISIT FOR SCREENING MAMMOGRAM: ICD-10-CM

## 2024-05-23 DIAGNOSIS — Z01.419 ENCOUNTER FOR GYNECOLOGICAL EXAMINATION WITHOUT ABNORMAL FINDING: Primary | ICD-10-CM

## 2024-05-23 PROBLEM — B35.1 ONYCHOMYCOSIS: Status: RESOLVED | Noted: 2023-05-11 | Resolved: 2024-05-23

## 2024-05-23 PROCEDURE — 3079F DIAST BP 80-89 MM HG: CPT | Performed by: OBSTETRICS & GYNECOLOGY

## 2024-05-23 PROCEDURE — 3074F SYST BP LT 130 MM HG: CPT | Performed by: OBSTETRICS & GYNECOLOGY

## 2024-05-23 PROCEDURE — 99396 PREV VISIT EST AGE 40-64: CPT | Performed by: OBSTETRICS & GYNECOLOGY

## 2024-05-23 RX ORDER — ARIPIPRAZOLE 2 MG/1
2 TABLET ORAL DAILY
COMMUNITY
Start: 2024-04-23

## 2024-05-23 RX ORDER — DESVENLAFAXINE 100 MG/1
100 TABLET, EXTENDED RELEASE ORAL DAILY
COMMUNITY
Start: 2024-04-29

## 2024-05-23 RX ORDER — FLUOCINOLONE ACETONIDE 0.1 MG/ML
SOLUTION TOPICAL
COMMUNITY
Start: 2024-04-01

## 2024-05-23 RX ORDER — DESONIDE 0.5 MG/G
OINTMENT TOPICAL
COMMUNITY
Start: 2024-03-06

## 2024-06-03 NOTE — TELEPHONE ENCOUNTER
E-scribe request for Minerin Creme. Please review and e-scribe if applicable.     Last Visit Date:  2/5/24  Next Visit Date:  Visit date not found    Hemoglobin A1C (%)   Date Value   02/05/2024 5.5   04/19/2022 5.3             ( goal A1C is < 7)   No components found for: \"LABMICR\"  No components found for: \"LDLCHOLESTEROL\", \"LDLCALC\"    (goal LDL is <100)   AST (U/L)   Date Value   02/16/2024 16     ALT (U/L)   Date Value   02/16/2024 13     BUN (mg/dL)   Date Value   02/16/2024 6     BP Readings from Last 3 Encounters:   05/23/24 118/86   02/05/24 139/88   05/11/23 (!) 142/92          (goal 120/80)        Patient Active Problem List:     Gallstones     Seborrheic dermatitis     Dermatitis     Cervical paraspinous muscle spasm     Neck pain     Encounter before starting medication     Primary hypertension

## 2024-06-04 RX ORDER — LANOLIN ALCOHOL/MO/W.PET/CERES
CREAM (GRAM) TOPICAL
Qty: 454 G | Refills: 0 | Status: SHIPPED | OUTPATIENT
Start: 2024-06-04 | End: 2024-06-06 | Stop reason: SDUPTHER

## 2024-06-06 ENCOUNTER — OFFICE VISIT (OUTPATIENT)
Dept: FAMILY MEDICINE CLINIC | Age: 44
End: 2024-06-06
Payer: MEDICAID

## 2024-06-06 ENCOUNTER — HOSPITAL ENCOUNTER (OUTPATIENT)
Age: 44
Discharge: HOME | End: 2024-06-08
Payer: MEDICAID

## 2024-06-06 ENCOUNTER — HOSPITAL ENCOUNTER (OUTPATIENT)
Dept: GENERAL RADIOLOGY | Age: 44
Discharge: HOME | End: 2024-06-08
Payer: MEDICAID

## 2024-06-06 VITALS
WEIGHT: 219 LBS | BODY MASS INDEX: 31.42 KG/M2 | OXYGEN SATURATION: 99 % | DIASTOLIC BLOOD PRESSURE: 88 MMHG | HEART RATE: 98 BPM | SYSTOLIC BLOOD PRESSURE: 131 MMHG

## 2024-06-06 DIAGNOSIS — R05.9 COUGH IN ADULT: Primary | ICD-10-CM

## 2024-06-06 DIAGNOSIS — R05.9 COUGH IN ADULT: ICD-10-CM

## 2024-06-06 LAB — CYTOLOGY REPORT: NORMAL

## 2024-06-06 PROCEDURE — 71046 X-RAY EXAM CHEST 2 VIEWS: CPT

## 2024-06-06 PROCEDURE — 99213 OFFICE O/P EST LOW 20 MIN: CPT

## 2024-06-06 PROCEDURE — 3079F DIAST BP 80-89 MM HG: CPT

## 2024-06-06 PROCEDURE — 3075F SYST BP GE 130 - 139MM HG: CPT

## 2024-06-06 RX ORDER — LANOLIN ALCOHOL/MO/W.PET/CERES
CREAM (GRAM) TOPICAL
Qty: 454 G | Refills: 1 | Status: SHIPPED | OUTPATIENT
Start: 2024-06-06

## 2024-06-06 ASSESSMENT — ENCOUNTER SYMPTOMS
COUGH: 1
GASTROINTESTINAL NEGATIVE: 1

## 2024-06-06 NOTE — PROGRESS NOTES
Visit Information    Have you changed or started any medications since your last visit including any over-the-counter medicines, vitamins, or herbal medicines? no   Are you having any side effects from any of your medications? -  no  Have you stopped taking any of your medications? Is so, why? -  no    Have you seen any other physician or provider since your last visit? Yes - Records Obtained  Have you had any other diagnostic tests since your last visit? Yes - Records Obtained  Have you been seen in the emergency room and/or had an admission to a hospital since we last saw you? No  Have you had your routine dental cleaning in the past 6 months? no    Have you activated your [employer] account? If not, what are your barriers? Yes     Patient Care Team:  Donnie Flores MD as PCP - General (Family Medicine)    Medical History Review  Past Medical, Family, and Social History reviewed and does contribute to the patient presenting condition    Health Maintenance   Topic Date Due    COVID-19 Vaccine (1) Never done    Breast cancer screen  10/25/2023    Lipids  04/05/2024    Flu vaccine (Season Ended) 08/01/2024    Depression Screen  02/05/2025    Cervical cancer screen  09/17/2025    DTaP/Tdap/Td vaccine (3 - Td or Tdap) 02/19/2028    Hepatitis B vaccine  Completed    Hepatitis C screen  Completed    HIV screen  Completed    Hepatitis A vaccine  Aged Out    Hib vaccine  Aged Out    HPV vaccine  Aged Out    Polio vaccine  Aged Out    Meningococcal (ACWY) vaccine  Aged Out    Pneumococcal 0-64 years Vaccine  Aged Out    Depression Monitoring  Discontinued

## 2024-06-06 NOTE — PROGRESS NOTES
Attending Physician Statement  I  have discussed the care of [facility] including pertinent history and exam findings with the resident. I agree with the assessment, plan and orders as documented by the resident.      /88   Pulse 98   Wt 99.3 kg (219 lb)   SpO2 99%   BMI 31.42 kg/m²    BP Readings from Last 3 Encounters:   06/06/24 131/88   05/23/24 118/86   02/05/24 139/88     Wt Readings from Last 3 Encounters:   06/06/24 99.3 kg (219 lb)   05/23/24 98.9 kg (218 lb)   02/05/24 98.7 kg (217 lb 9.6 oz)          Diagnosis Orders   1. Cough in adult  XR CHEST STANDARD (2 VW)        Chronic cough  Per patient request will order CXR      Laura Miller MD 6/6/2024 11:57 AM

## 2024-06-06 NOTE — PROGRESS NOTES
[facility] Residency Program - Outpatient Note      Subjective:    John Trujillo is a 43 y.o. female with  has a past medical history of Abnormal Pap smear of cervix, Anxiety, Breast cyst, left, Depression, GERD (gastroesophageal reflux disease), Headache, and Primary hypertension.    Presented to the office today for:  Chief Complaint   Patient presents with    Chest Congestion     Cough with phlegm notice 4/5 months ago, would like CXR       HPI    John Trujillo is a 43 y.o. female who presents for acute care evaluation of cough.     -For 3-4 months  -Mild throat irritation  -Productive Cough sputum always clear  -No triggers  -No smoking  -No shortness of breath, No asthma, COPD diagnosed  -Does have Long term Second hand smoke exposure  -No illness, flu in past winter season  -No sick contacts  -No fever, chills, N/V/D, Constipation, chest pain  -No new diet/medication  -No treatments at home  -No changes with season changes or exercise   -No acid reflux       Review of Systems   Constitutional: Negative.    HENT: Negative.     Respiratory:  Positive for cough.    Cardiovascular: Negative.    Gastrointestinal: Negative.    Genitourinary: Negative.    Musculoskeletal: Negative.    Neurological: Negative.                The patient has a   Family History   Problem Relation Age of Onset    No Known Problems Mother     Asthma Father     Diabetes Maternal Grandmother         IDDM    Heart Attack Maternal Grandmother     Stroke Maternal Grandmother     Heart Attack Maternal Grandfather     No Known Problems Paternal Grandmother     No Known Problems Paternal Grandfather        Objective:    /88   Pulse 98   Wt 99.3 kg (219 lb)   SpO2 99%   BMI 31.42 kg/m²    BP Readings from Last 3 Encounters:   06/06/24 131/88   05/23/24 118/86   02/05/24 139/88       Physical Exam  Vitals reviewed.   Constitutional:       Appearance: Normal appearance.   Cardiovascular:

## 2024-07-16 ENCOUNTER — HOSPITAL ENCOUNTER (OUTPATIENT)
Dept: MAMMOGRAPHY | Age: 44
Discharge: HOME | End: 2024-07-18
Payer: MEDICAID

## 2024-07-16 VITALS — BODY MASS INDEX: 31.35 KG/M2 | WEIGHT: 219 LBS | HEIGHT: 70 IN

## 2024-07-16 DIAGNOSIS — Z12.31 VISIT FOR SCREENING MAMMOGRAM: ICD-10-CM

## 2024-07-16 PROCEDURE — 77063 BREAST TOMOSYNTHESIS BI: CPT

## 2024-08-05 RX ORDER — ETONOGESTREL AND ETHINYL ESTRADIOL VAGINAL RING .015; .12 MG/D; MG/D
RING VAGINAL
Qty: 3 EACH | Refills: 3 | Status: SHIPPED | OUTPATIENT
Start: 2024-08-05

## 2024-08-07 RX ORDER — ETONOGESTREL AND ETHINYL ESTRADIOL VAGINAL RING .015; .12 MG/D; MG/D
RING VAGINAL
Refills: 4 | OUTPATIENT
Start: 2024-08-07

## 2024-09-19 ENCOUNTER — OFFICE VISIT (OUTPATIENT)
Dept: PODIATRY | Age: 44
End: 2024-09-19

## 2024-09-19 VITALS — BODY MASS INDEX: 32.21 KG/M2 | WEIGHT: 225 LBS | HEIGHT: 70 IN

## 2024-09-19 DIAGNOSIS — Q66.6 CONGENITAL PES PLANOVALGUS: ICD-10-CM

## 2024-09-19 DIAGNOSIS — M72.2 PLANTAR FASCIITIS OF RIGHT FOOT: ICD-10-CM

## 2024-09-19 DIAGNOSIS — M72.2 PLANTAR FASCIITIS OF LEFT FOOT: Primary | ICD-10-CM

## 2024-09-19 ASSESSMENT — ENCOUNTER SYMPTOMS
SHORTNESS OF BREATH: 0
BACK PAIN: 0
NAUSEA: 0
COLOR CHANGE: 0
DIARRHEA: 0

## 2024-09-22 RX ORDER — IBUPROFEN 800 MG/1
800 TABLET, FILM COATED ORAL 2 TIMES DAILY PRN
Qty: 180 TABLET | Refills: 1 | Status: SHIPPED | OUTPATIENT
Start: 2024-09-22

## 2024-10-14 ENCOUNTER — NURSE ONLY (OUTPATIENT)
Dept: PODIATRY | Age: 44
End: 2024-10-14
Payer: MEDICAID

## 2024-10-14 DIAGNOSIS — M72.2 PLANTAR FASCIITIS OF LEFT FOOT: Primary | ICD-10-CM

## 2024-10-14 DIAGNOSIS — M72.2 PLANTAR FASCIITIS OF RIGHT FOOT: ICD-10-CM

## 2024-10-14 DIAGNOSIS — Q66.6 CONGENITAL PES PLANOVALGUS: ICD-10-CM

## 2024-10-14 PROCEDURE — L3020 FOOT LONGITUD/METATARSAL SUP: HCPCS | Performed by: PODIATRIST

## 2024-11-05 ENCOUNTER — TELEPHONE (OUTPATIENT)
Dept: PODIATRY | Age: 44
End: 2024-11-05

## 2024-11-13 ENCOUNTER — NURSE ONLY (OUTPATIENT)
Dept: PODIATRY | Age: 44
End: 2024-11-13

## 2024-11-13 DIAGNOSIS — Q66.6 CONGENITAL PES PLANOVALGUS: ICD-10-CM

## 2024-11-13 DIAGNOSIS — M72.2 PLANTAR FASCIITIS OF RIGHT FOOT: ICD-10-CM

## 2024-11-13 DIAGNOSIS — M72.2 PLANTAR FASCIITIS OF LEFT FOOT: Primary | ICD-10-CM

## 2024-11-29 DIAGNOSIS — I10 PRIMARY HYPERTENSION: ICD-10-CM

## 2024-11-29 RX ORDER — AMLODIPINE BESYLATE 5 MG/1
5 TABLET ORAL DAILY
Qty: 90 TABLET | Refills: 3 | Status: SHIPPED | OUTPATIENT
Start: 2024-11-29

## 2024-11-29 NOTE — TELEPHONE ENCOUNTER
Please address the medication refill and close the encounter.  If I can be of assistance, please route to the applicable pool.      Thank you.      Last visit: 6-6-2024  Last Med refill: 1-  Does patient have enough medication for 72 hours: No:     Next Visit Date:  No future appointments.    Health Maintenance   Topic Date Due    Lipids  04/05/2024    Flu vaccine (1) 08/01/2024    COVID-19 Vaccine (1 - 2023-24 season) Never done    Colorectal Cancer Screen  Never done    Depression Screen  02/05/2025    Breast cancer screen  07/16/2026    Cervical cancer screen  05/23/2027    DTaP/Tdap/Td vaccine (3 - Td or Tdap) 02/19/2028    Hepatitis B vaccine  Completed    Hepatitis C screen  Completed    HIV screen  Completed    Hepatitis A vaccine  Aged Out    Hib vaccine  Aged Out    HPV vaccine  Aged Out    Polio vaccine  Aged Out    Meningococcal (ACWY) vaccine  Aged Out    Pneumococcal 0-64 years Vaccine  Aged Out    Depression Monitoring  Discontinued       Hemoglobin A1C (%)   Date Value   02/05/2024 5.5   04/19/2022 5.3             ( goal A1C is < 7)   No components found for: \"LABMICR\"  No components found for: \"LDLCHOLESTEROL\", \"LDLCALC\"    (goal LDL is <100)   AST (U/L)   Date Value   02/16/2024 16     ALT (U/L)   Date Value   02/16/2024 13     BUN (mg/dL)   Date Value   02/16/2024 6     BP Readings from Last 3 Encounters:   06/06/24 131/88   05/23/24 118/86   02/05/24 139/88          (goal 120/80)    All Future Testing planned in [facility]  Lab Frequency Next Occurrence   PAP SMEAR Once 06/17/2024               Patient Active Problem List:     Gallstones     Seborrheic dermatitis     Dermatitis     Cervical paraspinous muscle spasm     Neck pain     Encounter before starting medication     Primary hypertension     Cough in adult

## 2025-02-06 DIAGNOSIS — I10 PRIMARY HYPERTENSION: ICD-10-CM

## 2025-02-07 RX ORDER — ETONOGESTREL AND ETHINYL ESTRADIOL VAGINAL RING .015; .12 MG/D; MG/D
RING VAGINAL
Qty: 3 EACH | Refills: 0 | Status: SHIPPED | OUTPATIENT
Start: 2025-02-07

## 2025-02-07 NOTE — TELEPHONE ENCOUNTER
Last visit: 06/06/2024  Last Med refill: 11/29/2024  Does patient have enough medication for 72 hours: No:     Next Visit Date:  Future Appointments   Date Time Provider Department Center   2/13/2025  3:15 PM Donnie Flores MD [facility]       Health Maintenance   Topic Date Due    Lipids  04/05/2024    Flu vaccine (1) 08/01/2024    COVID-19 Vaccine (1 - 2024-25 season) Never done    Colorectal Cancer Screen  Never done    Depression Screen  02/05/2025    Breast cancer screen  07/16/2026    Cervical cancer screen  05/23/2027    DTaP/Tdap/Td vaccine (3 - Td or Tdap) 02/19/2028    Hepatitis B vaccine  Completed    Hepatitis C screen  Completed    HIV screen  Completed    Hepatitis A vaccine  Aged Out    Hib vaccine  Aged Out    HPV vaccine  Aged Out    Polio vaccine  Aged Out    Meningococcal (ACWY) vaccine  Aged Out    Pneumococcal 0-49 years Vaccine  Aged Out    Depression Monitoring  Discontinued       Hemoglobin A1C (%)   Date Value   02/05/2024 5.5   04/19/2022 5.3             ( goal A1C is < 7)   No components found for: \"LABMICR\"  No components found for: \"LDLCHOLESTEROL\", \"LDLCALC\"    (goal LDL is <100)   AST (U/L)   Date Value   02/16/2024 16     ALT (U/L)   Date Value   02/16/2024 13     BUN (mg/dL)   Date Value   02/16/2024 6     BP Readings from Last 3 Encounters:   06/06/24 131/88   05/23/24 118/86   02/05/24 139/88          (goal 120/80)    All Future Testing planned in [facility]  Lab Frequency Next Occurrence   PAP SMEAR Once 06/17/2024               Patient Active Problem List:     Gallstones     Seborrheic dermatitis     Dermatitis     Cervical paraspinous muscle spasm     Neck pain     Encounter before starting medication     Primary hypertension     Cough in adult

## 2025-02-08 RX ORDER — AMLODIPINE BESYLATE 5 MG/1
5 TABLET ORAL DAILY
Qty: 90 TABLET | Refills: 3 | Status: SHIPPED | OUTPATIENT
Start: 2025-02-08

## 2025-02-08 RX ORDER — LANOLIN ALCOHOL/MO/W.PET/CERES
CREAM (GRAM) TOPICAL
Qty: 454 G | Refills: 1 | Status: SHIPPED | OUTPATIENT
Start: 2025-02-08

## 2025-02-18 ENCOUNTER — OFFICE VISIT (OUTPATIENT)
Dept: FAMILY MEDICINE CLINIC | Age: 45
End: 2025-02-18
Payer: MEDICAID

## 2025-02-18 ENCOUNTER — PATIENT MESSAGE (OUTPATIENT)
Dept: FAMILY MEDICINE CLINIC | Age: 45
End: 2025-02-18

## 2025-02-18 VITALS
SYSTOLIC BLOOD PRESSURE: 138 MMHG | HEART RATE: 88 BPM | TEMPERATURE: 96.8 F | DIASTOLIC BLOOD PRESSURE: 100 MMHG | HEIGHT: 70 IN | BODY MASS INDEX: 32.28 KG/M2

## 2025-02-18 DIAGNOSIS — I10 PRIMARY HYPERTENSION: Primary | ICD-10-CM

## 2025-02-18 DIAGNOSIS — Z13.220 SCREENING FOR HYPERLIPIDEMIA: ICD-10-CM

## 2025-02-18 DIAGNOSIS — Z12.11 SCREEN FOR COLON CANCER: ICD-10-CM

## 2025-02-18 DIAGNOSIS — Z13.1 SCREENING FOR DIABETES MELLITUS: ICD-10-CM

## 2025-02-18 DIAGNOSIS — Z76.89 ENCOUNTER FOR WEIGHT MANAGEMENT: ICD-10-CM

## 2025-02-18 DIAGNOSIS — E55.9 VITAMIN D DEFICIENCY: ICD-10-CM

## 2025-02-18 PROCEDURE — 99211 OFF/OP EST MAY X REQ PHY/QHP: CPT | Performed by: FAMILY MEDICINE

## 2025-02-18 RX ORDER — MUPIROCIN CALCIUM 20 MG/G
CREAM TOPICAL
COMMUNITY
Start: 2024-12-17

## 2025-02-18 RX ORDER — CICLOPIROX 1 G/100ML
1 SHAMPOO TOPICAL PRN
Qty: 1 EACH | Refills: 1 | Status: SHIPPED | OUTPATIENT
Start: 2025-02-18

## 2025-02-18 RX ORDER — VITAMIN B COMPLEX
1000 TABLET ORAL DAILY
Qty: 60 TABLET | Refills: 5 | Status: SHIPPED | OUTPATIENT
Start: 2025-02-18

## 2025-02-18 RX ORDER — SODIUM FLUORIDE 5 MG/G
CREAM DENTAL
COMMUNITY
Start: 2024-11-19

## 2025-02-18 SDOH — ECONOMIC STABILITY: FOOD INSECURITY: WITHIN THE PAST 12 MONTHS, YOU WORRIED THAT YOUR FOOD WOULD RUN OUT BEFORE YOU GOT MONEY TO BUY MORE.: NEVER TRUE

## 2025-02-18 SDOH — ECONOMIC STABILITY: FOOD INSECURITY: WITHIN THE PAST 12 MONTHS, THE FOOD YOU BOUGHT JUST DIDN'T LAST AND YOU DIDN'T HAVE MONEY TO GET MORE.: NEVER TRUE

## 2025-02-18 ASSESSMENT — PATIENT HEALTH QUESTIONNAIRE - PHQ9
SUM OF ALL RESPONSES TO PHQ QUESTIONS 1-9: 0
1. LITTLE INTEREST OR PLEASURE IN DOING THINGS: NOT AT ALL
2. FEELING DOWN, DEPRESSED OR HOPELESS: NOT AT ALL
SUM OF ALL RESPONSES TO PHQ QUESTIONS 1-9: 0
SUM OF ALL RESPONSES TO PHQ9 QUESTIONS 1 & 2: 0
SUM OF ALL RESPONSES TO PHQ QUESTIONS 1-9: 0
SUM OF ALL RESPONSES TO PHQ QUESTIONS 1-9: 0

## 2025-02-18 NOTE — PROGRESS NOTES
HYPERTENSION visit     BP Readings from Last 3 Encounters:   06/06/24 131/88   05/23/24 118/86   02/05/24 139/88       HDL (mg/dL)   Date Value   04/05/2019 58     BUN (mg/dL)   Date Value   02/16/2024 6     Creatinine (mg/dL)   Date Value   02/16/2024 0.6     Glucose (mg/dL)   Date Value   02/16/2024 73              Have you changed or started any medications since your last visit including any over-the-counter medicines, vitamins, or herbal medicines? no   Have you stopped taking any of your medications? Is so, why? -  yes - see list  Are you having any side effects from any of your medications? - no  How often do you miss doses of your medication? occasional      Have you seen any other physician or provider since your last visit?  no   Have you had any other diagnostic tests since your last visit?  no   Have you been seen in the emergency room and/or had an admission in a hospital since we last saw you?  no   Have you had your routine dental cleaning in the past 6 months?  no     Do you have an active MyChart account? If no, what is the barrier?  Yes    Patient Care Team:  Donnie Flores MD as PCP - General (Family Medicine)    Medical History Review  Past Medical, Family, and Social History reviewed and does not contribute to the patient presenting condition    Health Maintenance   Topic Date Due    Lipids  04/05/2024    Flu vaccine (1) 08/01/2024    COVID-19 Vaccine (1 - 2024-25 season) Never done    Colorectal Cancer Screen  Never done    Depression Screen  02/05/2025    Breast cancer screen  07/16/2026    Cervical cancer screen  05/23/2027    DTaP/Tdap/Td vaccine (3 - Td or Tdap) 02/19/2028    Hepatitis B vaccine  Completed    Hepatitis C screen  Completed    HIV screen  Completed    Hepatitis A vaccine  Aged Out    Hib vaccine  Aged Out    HPV vaccine  Aged Out    Polio vaccine  Aged Out    Meningococcal (ACWY) vaccine  Aged Out    Pneumococcal 0-49 years Vaccine  Aged Out    Depression Monitoring

## 2025-02-18 NOTE — PROGRESS NOTES
Attending Physician Statement  I have discussed the care of John Trujillo, 44 y.o. female,including pertinent history and exam findings,  with the resident Donnie Sainz MD.  History:  Chief Complaint   Patient presents with    Hypertension    Health Maintenance     Cologuard pended        I have reviewed the key elements of the encounter with the resident. Examination was done by resident as documented in residents note.    BP Readings from Last 3 Encounters:   02/18/25 (!) 138/100   06/06/24 131/88   05/23/24 118/86     BP (!) 138/100 (Site: Left Upper Arm, Position: Sitting, Cuff Size: Medium Adult)   Pulse 88   Temp 96.8 °F (36 °C) (Oral)   Ht 1.778 m (5' 10\")   LMP 12/30/2024 (Approximate) Comment: patient has the nuvaring  BMI 32.28 kg/m²   Lab Results   Component Value Date    WBC 5.7 02/16/2024    HGB 12.5 02/16/2024    HCT 41.2 02/16/2024     02/16/2024    CHOL 132 04/05/2019    TRIG 71 04/05/2019    HDL 58 04/05/2019    ALT 13 02/16/2024    AST 16 02/16/2024     02/16/2024    K 3.6 (L) 02/16/2024     02/16/2024    CREATININE 0.6 02/16/2024    BUN 6 02/16/2024    CO2 20 02/16/2024    TSH 1.83 01/11/2022    LABA1C 5.5 02/05/2024     Lab Results   Component Value Date    CALCIUM 8.8 02/16/2024     No results found for: \"LDLDIRECT\"  I agree with the assessment, plan and diagnosis of    Diagnosis Orders   1. Screening for hyperlipidemia  Lipid Panel      2. Psoriasis  Ciclopirox 1 % SHAM      3. Vitamin D deficiency  Vitamin D (CHOLECALCIFEROL) 25 MCG (1000 UT) TABS tablet      4. Encounter for weight management  Coreen Lovell CNP, Weight Management, [place]      5. Screen for colon cancer  Fecal DNA Colorectal cancer screening (Cologuard)      6. Screening for diabetes mellitus  Hemoglobin A1C        I agree with orders as documented by the resident.    Recommendations: Agree with resident assessment and plan.    Return in about 4 weeks (around 3/18/2025).   (GE

## 2025-02-18 NOTE — PATIENT INSTRUCTIONS
Thank you for letting us take care of you today. We hope all your questions were addressed. If a question was overlooked or something else comes to mind after you return home, please contact a member of your Care Team listed below.        Your Care Team at [facility] is Team #4  West Cronin M.D. (Faculty)  Donnie Flores M.D. (Resident)  Miri Evans M.D.  (Resident)  Elliott Novak M.D. (Resident)  Rebeca Robert M.D. (Resident)  Jovany Lopez, KEN Soliz, [facility]  Pamela Diop, [facility]  Kristin Akhtar, [facility]  Deb Lai, [facility]  Meg Hopson, [facility]  Alessandra Wilson (LJ) BRAYAN Waggoner (Clinical Practice Manager)  Belen Allen [facility] (Clinical Pharmacist)       Office phone number: [phone]    If you need to get in right away due to illness, please be advised we have \"Same Day\" appointments available Monday-Friday. Please call us at [phone] option #3 to schedule your \"Same Day\" appointment.

## 2025-02-18 NOTE — PROGRESS NOTES
[facility] Residency Program - Outpatient Note      Subjective:    John Trujillo is a 44 y.o. female with  has a past medical history of Abnormal Pap smear of cervix, Anxiety, Breast cyst, left, Depression, GERD (gastroesophageal reflux disease), Headache, and Primary hypertension.    Presented to the office today for:  Chief Complaint   Patient presents with    Hypertension    Health Maintenance     Cologuard pended        HPI    HTN  /87, repeat 138/100  Completely asymptomatic  Amlodipine 5 mg  Not taking her meds  Encouraged medication compliance and discussed risks of persistent HTN    Onychomycosis  Patient was using ciclopirox but stopped, requesting a refill today    Weight management  Patient BMI 32.28 and wants to discuss weight management with a specialist, will provide referral    Caregaps  Ordered A1c, lipid panel  Ordered cologuard    Review of Systems   Constitutional: Negative.    HENT: Negative.     Respiratory: Negative.     Cardiovascular: Negative.    Gastrointestinal: Negative.    Genitourinary: Negative.    Musculoskeletal: Negative.    Skin: Negative.    Neurological: Negative.    Psychiatric/Behavioral: Negative.           The patient has a   Family History   Problem Relation Age of Onset    No Known Problems Mother     Asthma Father     Diabetes Maternal Grandmother         IDDM    Heart Attack Maternal Grandmother     Stroke Maternal Grandmother     Heart Attack Maternal Grandfather     No Known Problems Paternal Grandmother     No Known Problems Paternal Grandfather        Objective:    BP (!) 138/100 (Site: Left Upper Arm, Position: Sitting, Cuff Size: Medium Adult)   Pulse 88   Temp 96.8 °F (36 °C) (Oral)   Ht 1.778 m (5' 10\")   LMP 12/30/2024 (Approximate) Comment: patient has the nuvaring  BMI 32.28 kg/m²    BP Readings from Last 3 Encounters:   02/18/25 (!) 138/100   06/06/24 131/88   05/23/24 118/86       Physical Exam  Vitals and

## 2025-02-19 ASSESSMENT — ENCOUNTER SYMPTOMS
RESPIRATORY NEGATIVE: 1
GASTROINTESTINAL NEGATIVE: 1

## 2025-03-04 RX ORDER — ETONOGESTREL AND ETHINYL ESTRADIOL VAGINAL RING .015; .12 MG/D; MG/D
RING VAGINAL
Qty: 3 EACH | Refills: 0 | Status: SHIPPED | OUTPATIENT
Start: 2025-03-04

## 2025-03-18 ENCOUNTER — OFFICE VISIT (OUTPATIENT)
Dept: FAMILY MEDICINE CLINIC | Age: 45
End: 2025-03-18
Payer: MEDICAID

## 2025-03-18 VITALS
HEIGHT: 70 IN | SYSTOLIC BLOOD PRESSURE: 137 MMHG | HEART RATE: 92 BPM | DIASTOLIC BLOOD PRESSURE: 89 MMHG | BODY MASS INDEX: 32.21 KG/M2 | WEIGHT: 225 LBS

## 2025-03-18 DIAGNOSIS — I10 ESSENTIAL HYPERTENSION: Primary | ICD-10-CM

## 2025-03-18 PROCEDURE — 99213 OFFICE O/P EST LOW 20 MIN: CPT

## 2025-03-18 PROCEDURE — 3075F SYST BP GE 130 - 139MM HG: CPT

## 2025-03-18 PROCEDURE — 3079F DIAST BP 80-89 MM HG: CPT

## 2025-03-18 RX ORDER — BLOOD PRESSURE TEST KIT
1 KIT MISCELLANEOUS DAILY
Qty: 1 KIT | Refills: 0 | Status: SHIPPED | OUTPATIENT
Start: 2025-03-18

## 2025-03-18 ASSESSMENT — ENCOUNTER SYMPTOMS
DIARRHEA: 0
COUGH: 0
SHORTNESS OF BREATH: 0
BACK PAIN: 0
ABDOMINAL PAIN: 0
SORE THROAT: 0
RHINORRHEA: 0
NAUSEA: 0

## 2025-03-18 NOTE — PROGRESS NOTES
HYPERTENSION visit     BP Readings from Last 3 Encounters:   02/18/25 (!) 138/100   06/06/24 131/88   05/23/24 118/86       HDL (mg/dL)   Date Value   04/05/2019 58     BUN (mg/dL)   Date Value   02/16/2024 6     Creatinine (mg/dL)   Date Value   02/16/2024 0.6     Glucose (mg/dL)   Date Value   02/16/2024 73              Have you changed or started any medications since your last visit including any over-the-counter medicines, vitamins, or herbal medicines? no   Have you stopped taking any of your medications? Is so, why? -  no  Are you having any side effects from any of your medications? - no  How often do you miss doses of your medication? rare      Have you seen any other physician or provider since your last visit?  no   Have you had any other diagnostic tests since your last visit?  no   Have you been seen in the emergency room and/or had an admission in a hospital since we last saw you?  no   Have you had your routine dental cleaning in the past 6 months?  no     Do you have an active MyChart account? If no, what is the barrier?  Yes    Patient Care Team:  Donnie Flores MD as PCP - General (Family Medicine)    Medical History Review  Past Medical, Family, and Social History reviewed and does contribute to the patient presenting condition    Health Maintenance   Topic Date Due    Lipids  04/05/2024    Flu vaccine (1) 08/01/2024    COVID-19 Vaccine (1 - 2024-25 season) Never done    Colorectal Cancer Screen  Never done    Depression Screen  02/18/2026    Breast cancer screen  07/16/2026    Cervical cancer screen  05/23/2027    DTaP/Tdap/Td vaccine (3 - Td or Tdap) 02/19/2028    Hepatitis B vaccine  Completed    Hepatitis C screen  Completed    HIV screen  Completed    Hepatitis A vaccine  Aged Out    Hib vaccine  Aged Out    HPV vaccine  Aged Out    Polio vaccine  Aged Out    Meningococcal (ACWY) vaccine  Aged Out    Meningococcal B vaccine  Aged Out    Pneumococcal 0-49 years Vaccine  Aged Out    Depression

## 2025-03-18 NOTE — PATIENT INSTRUCTIONS
Thank you for letting us take care of you today. We hope all your questions were addressed. If a question was overlooked or something else comes to mind after you return home, please contact a member of your Care Team listed below.      Your Care Team at [facility] is Team #1  Tiana Ashton M.D. (Faculty)  Debbie Calderón M.D. (Resident)  Luis Weinberg D.O. (Resident)  Filippo oRberto M.D. (Resident)  Edni Oneill M.D. (Resident)  Yumiko Lopez, [facility]  Alessandra Soliz, [facility]  Deb Lai, [facility]  Kristin Akhtar, [facility]  Meg Hopson, [facility]  Pamela Diop, [facility]  Jovany Wilson (LJ) Rosaline,   Belen Allen [facility] (Clinical Pharmacist)     Office phone number: [phone]    If you need to get in right away due to illness, please be advised we have \"Same Day\" appointments available Monday-Friday. Please call us at [phone] option #3 to schedule your \"Same Day\" appointment.

## 2025-03-18 NOTE — PROGRESS NOTES
ATTENDING NOTE    Attending Physician Statement  [facility]  with [facility]. I have reviewed the key elements of all parts of the encounter with the resident.  I agree with the assessment, plan and orders as documented by the resident.  (GE Modifier)    Past Medical History:   Diagnosis Date    Abnormal Pap smear of cervix 1999    Anxiety     Breast cyst, left 10/27/2021    Depression     GERD (gastroesophageal reflux disease)     Headache     Primary hypertension 5/11/2023       Vitals:    03/18/25 1535   BP: 137/89   Pulse: 92       [place] \"Maty'\" was seen today for hypertension.    Diagnoses and all orders for this visit:    Essential hypertension  -     Blood Pressure KIT; 1 each by Does not apply route daily

## 2025-03-18 NOTE — PROGRESS NOTES
[facility] Residency Program - Outpatient Note      Subjective:    John Trujillo is a 44 y.o. female with  has a past medical history of Abnormal Pap smear of cervix, Anxiety, Breast cyst, left, Depression, GERD (gastroesophageal reflux disease), Headache, and Primary hypertension.    Presented to the office today for:  Chief Complaint   Patient presents with    Hypertension     1 month follow up on HTN       HPI    HTN  -today well controlled at 137/89  -on amlodipine 5mg   -also came with bp kit, to compare if it is accurate  -Blood pressure kit, stating blood pressure of 150/110, assured patient that it is inaccurate when compared to the blood pressure reading in office.  -Will give prescription for a new blood pressure kit  -Otherwise patient is doing well, denies any chest pain, shortness of breath or any other sign of cardiovascular disease.        Review of Systems   Constitutional:  Negative for chills and fever.   HENT:  Negative for rhinorrhea and sore throat.    Eyes:  Negative for visual disturbance.   Respiratory:  Negative for cough and shortness of breath.    Cardiovascular:  Negative for chest pain and leg swelling.   Gastrointestinal:  Negative for abdominal pain, diarrhea and nausea.   Genitourinary:  Negative for dysuria and hematuria.   Musculoskeletal:  Negative for back pain.   Skin:  Negative for rash.   Neurological:  Negative for numbness and headaches.   Psychiatric/Behavioral:  Negative for agitation.                  The patient has a   Family History   Problem Relation Age of Onset    No Known Problems Mother     Asthma Father     Diabetes Maternal Grandmother         IDDM    Heart Attack Maternal Grandmother     Stroke Maternal Grandmother     Heart Attack Maternal Grandfather     No Known Problems Paternal Grandmother     No Known Problems Paternal Grandfather        Objective:    /89 (BP Site: Right Upper Arm, Patient Position: Sitting,

## 2025-05-30 NOTE — PROGRESS NOTES
[facility]  [address]  [address]  [place] [address]  Dept: [phone]      25    John Trujillo     Gyn Annual Exam      CHIEF COMPLAINT:    Chief Complaint   Patient presents with    Annual Exam              Blood pressure (!) 159/94, height 1.778 m (5' 10\"), weight 99.8 kg (220 lb), last menstrual period 2025, not currently breastfeeding.           HPI :     John rTujillo 1980 is a 44 y.o.   who is here for her annual exam. 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  _____________________________________________________________________  Past Medical History:   Diagnosis Date    Abnormal Pap smear of cervix     Anxiety     Breast cyst, left 10/27/2021    Depression     GERD (gastroesophageal reflux disease)     Headache     Primary hypertension 2023                                                                   Past Surgical History:   Procedure Laterality Date     SECTION, LOW TRANSVERSE      COLPOSCOPY      GALLBLADDER SURGERY  2004     Family History   Problem Relation Age of Onset    No Known Problems Mother     Asthma Father     Diabetes Maternal Grandmother         IDDM    Heart Attack Maternal Grandmother     Stroke Maternal Grandmother     Heart Attack Maternal Grandfather     No Known Problems Paternal Grandmother     No Known Problems Paternal Grandfather      Social History     Tobacco Use   Smoking Status Never   Smokeless Tobacco Never     Social History     Substance and Sexual Activity   Alcohol Use Yes     Current Outpatient Medications   Medication Sig Dispense Refill    etonogestrel-ethinyl estradiol (ENILLORING) 0.12-0.015 MG/24HR vaginal ring insert 1 ring vaginally for 3 weeks (USE CONTINUOUSLY) 3 each 3    Blood Pressure KIT 1 each by Does not apply

## 2025-06-02 ENCOUNTER — OFFICE VISIT (OUTPATIENT)
Dept: OBGYN CLINIC | Age: 45
End: 2025-06-02
Payer: MEDICAID

## 2025-06-02 VITALS
SYSTOLIC BLOOD PRESSURE: 159 MMHG | BODY MASS INDEX: 31.5 KG/M2 | DIASTOLIC BLOOD PRESSURE: 94 MMHG | WEIGHT: 220 LBS | HEIGHT: 70 IN

## 2025-06-02 DIAGNOSIS — Z12.31 ENCOUNTER FOR SCREENING MAMMOGRAM FOR BREAST CANCER: ICD-10-CM

## 2025-06-02 DIAGNOSIS — N92.0 MENORRHAGIA WITH REGULAR CYCLE: ICD-10-CM

## 2025-06-02 DIAGNOSIS — Z01.419 ENCOUNTER FOR GYNECOLOGICAL EXAMINATION: Primary | ICD-10-CM

## 2025-06-02 PROCEDURE — 3080F DIAST BP >= 90 MM HG: CPT | Performed by: NURSE PRACTITIONER

## 2025-06-02 PROCEDURE — 99396 PREV VISIT EST AGE 40-64: CPT | Performed by: NURSE PRACTITIONER

## 2025-06-02 PROCEDURE — 3077F SYST BP >= 140 MM HG: CPT | Performed by: NURSE PRACTITIONER

## 2025-06-02 RX ORDER — ETONOGESTREL AND ETHINYL ESTRADIOL VAGINAL RING .015; .12 MG/D; MG/D
RING VAGINAL
Qty: 3 EACH | Refills: 3 | Status: SHIPPED | OUTPATIENT
Start: 2025-06-02

## 2025-06-02 ASSESSMENT — ENCOUNTER SYMPTOMS
BACK PAIN: 0
COUGH: 0
GASTROINTESTINAL NEGATIVE: 1
ALLERGIC/IMMUNOLOGIC NEGATIVE: 1
ABDOMINAL DISTENTION: 0
SHORTNESS OF BREATH: 0
RESPIRATORY NEGATIVE: 1

## 2025-06-12 RX ORDER — LANOLIN ALCOHOL/MO/W.PET/CERES
CREAM (GRAM) TOPICAL
Qty: 454 G | Refills: 1 | Status: SHIPPED | OUTPATIENT
Start: 2025-06-12

## 2025-06-12 NOTE — TELEPHONE ENCOUNTER
Last visit: 3.18.25  Last Med refill: 4.25.25  Does patient have enough medication for 72 hours: Yes    Next Visit Date:  Future Appointments   Date Time Provider Department Center   9/11/2025  8:45 AM Krystyna Pearson APRN - CNP [facility]   6/5/2026  8:40 AM Sabrina Coffey APRN - CNP [facility]       Health Maintenance   Topic Date Due    Lipids  04/05/2024    COVID-19 Vaccine (1 - 2024-25 season) Never done    Colorectal Cancer Screen  Never done    Flu vaccine (Season Ended) 08/01/2025    Depression Screen  02/18/2026    Breast cancer screen  07/16/2026    Cervical cancer screen  05/23/2027    DTaP/Tdap/Td vaccine (3 - Td or Tdap) 02/19/2028    Hepatitis B vaccine  Completed    Hepatitis C screen  Completed    HIV screen  Completed    Hepatitis A vaccine  Aged Out    Hib vaccine  Aged Out    HPV vaccine  Aged Out    Polio vaccine  Aged Out    Meningococcal (ACWY) vaccine  Aged Out    Meningococcal B vaccine  Aged Out    Pneumococcal 0-49 years Vaccine  Aged Out    Depression Monitoring  Discontinued       Hemoglobin A1C (%)   Date Value   02/05/2024 5.5   04/19/2022 5.3             ( goal A1C is < 7)   No components found for: \"LABMICR\"  No components found for: \"LDLCHOLESTEROL\", \"LDLCALC\"    (goal LDL is <100)   AST (U/L)   Date Value   02/16/2024 16     ALT (U/L)   Date Value   02/16/2024 13     BUN (mg/dL)   Date Value   02/16/2024 6     BP Readings from Last 3 Encounters:   06/02/25 (!) 159/94   03/18/25 137/89   02/18/25 (!) 138/100          (goal 120/80)    All Future Testing planned in [facility]  Lab Frequency Next Occurrence   Lipid Panel Once 02/18/2025   Hemoglobin A1C Once 02/18/2025   FABIÁN MICHELLE DIGITAL SCREEN BILATERAL Once 06/02/2025               Patient Active Problem List:     Gallstones     Seborrheic dermatitis     Dermatitis     Cervical paraspinous muscle spasm     Neck pain     Encounter before starting medication     Primary hypertension     Cough in adult

## 2025-06-17 DIAGNOSIS — I10 PRIMARY HYPERTENSION: ICD-10-CM

## 2025-06-17 DIAGNOSIS — E55.9 VITAMIN D DEFICIENCY: ICD-10-CM

## 2025-06-17 RX ORDER — VITAMIN B COMPLEX
1000 TABLET ORAL DAILY
Qty: 60 TABLET | Refills: 5 | Status: SHIPPED | OUTPATIENT
Start: 2025-06-17

## 2025-06-17 RX ORDER — AMLODIPINE BESYLATE 5 MG/1
5 TABLET ORAL DAILY
Qty: 90 TABLET | Refills: 3 | Status: SHIPPED | OUTPATIENT
Start: 2025-06-17

## 2025-06-17 NOTE — TELEPHONE ENCOUNTER
Last visit: 03/18/2025  Last Med refill: 02/18/2025  Does patient have enough medication for 72 hours: No    Next Visit Date:  Future Appointments   Date Time Provider Department Center   9/11/2025  8:45 AM Krystyna Pearson APRN - [facility]NP [facility]   6/5/2026  8:40 AM Sabrina Coffye APRN - [facility]       Health Maintenance   Topic Date Due    Lipids  04/05/2024    COVID-19 Vaccine (1 - 2024-25 season) Never done    Colorectal Cancer Screen  Never done    Flu vaccine (Season Ended) 08/01/2025    Depression Screen  02/18/2026    Breast cancer screen  07/16/2026    Cervical cancer screen  05/23/2027    DTaP/Tdap/Td vaccine (3 - Td or Tdap) 02/19/2028    Hepatitis B vaccine  Completed    Hepatitis C screen  Completed    HIV screen  Completed    Hepatitis A vaccine  Aged Out    Hib vaccine  Aged Out    HPV vaccine  Aged Out    Polio vaccine  Aged Out    Meningococcal (ACWY) vaccine  Aged Out    Meningococcal B vaccine  Aged Out    Pneumococcal 0-49 years Vaccine  Aged Out    Depression Monitoring  Discontinued       Hemoglobin A1C (%)   Date Value   02/05/2024 5.5   04/19/2022 5.3             ( goal A1C is < 7)   No components found for: \"LABMICR\"  No components found for: \"LDLCHOLESTEROL\", \"LDLCALC\"    (goal LDL is <100)   AST (U/L)   Date Value   02/16/2024 16     ALT (U/L)   Date Value   02/16/2024 13     BUN (mg/dL)   Date Value   02/16/2024 6     BP Readings from Last 3 Encounters:   06/02/25 (!) 159/94   03/18/25 137/89   02/18/25 (!) 138/100          (goal 120/80)    All Future Testing planned in [facility]  Lab Frequency Next Occurrence   Lipid Panel Once 02/18/2025   Hemoglobin A1C Once 02/18/2025   FABIÁN MICHELLE DIGITAL SCREEN BILATERAL Once 06/02/2025               Patient Active Problem List:     Gallstones     Seborrheic dermatitis     Dermatitis     Cervical paraspinous muscle spasm     Neck pain     Encounter before starting medication     Primary hypertension     Cough in adult